# Patient Record
Sex: MALE | Race: WHITE | NOT HISPANIC OR LATINO | Employment: FULL TIME | URBAN - METROPOLITAN AREA
[De-identification: names, ages, dates, MRNs, and addresses within clinical notes are randomized per-mention and may not be internally consistent; named-entity substitution may affect disease eponyms.]

---
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
## 2019-02-26 ENCOUNTER — OFFICE VISIT (OUTPATIENT)
Dept: FAMILY MEDICINE | Facility: CLINIC | Age: 19
End: 2019-02-26

## 2019-02-26 VITALS
HEART RATE: 72 BPM | HEIGHT: 66 IN | TEMPERATURE: 98.9 F | WEIGHT: 140.6 LBS | BODY MASS INDEX: 22.6 KG/M2 | DIASTOLIC BLOOD PRESSURE: 68 MMHG | OXYGEN SATURATION: 98 % | SYSTOLIC BLOOD PRESSURE: 112 MMHG

## 2019-02-26 DIAGNOSIS — F90.9 ATTENTION DEFICIT HYPERACTIVITY DISORDER (ADHD), UNSPECIFIED ADHD TYPE: ICD-10-CM

## 2019-02-26 DIAGNOSIS — Z11.3 SCREEN FOR STD (SEXUALLY TRANSMITTED DISEASE): Primary | ICD-10-CM

## 2019-02-26 DIAGNOSIS — F12.10 CANNABIS ABUSE, EPISODIC: ICD-10-CM

## 2019-02-26 DIAGNOSIS — G47.00 PERSISTENT INSOMNIA: ICD-10-CM

## 2019-02-26 DIAGNOSIS — F15.11 AMPHETAMINE ABUSE IN REMISSION (H): ICD-10-CM

## 2019-02-26 DIAGNOSIS — F31.9 BIPOLAR I DISORDER (H): ICD-10-CM

## 2019-02-26 DIAGNOSIS — F39 MOOD DISORDER (H): ICD-10-CM

## 2019-02-26 PROCEDURE — 36415 COLL VENOUS BLD VENIPUNCTURE: CPT | Performed by: PHYSICIAN ASSISTANT

## 2019-02-26 PROCEDURE — 99213 OFFICE O/P EST LOW 20 MIN: CPT | Performed by: PHYSICIAN ASSISTANT

## 2019-02-26 ASSESSMENT — MIFFLIN-ST. JEOR: SCORE: 1587.57

## 2019-02-26 NOTE — NURSING NOTE
Aren is here for STD testing and sleep meds    Pre-visit Screening:  Immunizations:  up to date declines flu shot  Colonoscopy:  NA  Mammogram: NA  Asthma Action Test/Plan:  NA  PHQ9:  Sees psych  GAD7:  Sees psych  Questioned patient about current smoking habits Pt. vapes does not use tobacco  Ok to leave detailed message on voice mail for today's visit only Yes, phone # [phone]

## 2019-02-26 NOTE — PROGRESS NOTES
SUBJECTIVE:   Aren Emanuel is a 18 year old male who presents to clinic today for the following health issues:      Pt here requesting STD testing today  Partner(s): new  Recent known STD exposure: no  Past Hx of STD and treatment: no  Current symptoms of discharge/dysuria: no  Contraception use: usually condoms    Current partner has remote hx of treated chlamydia        Pt also wanting to get on Sleeping medication for trouble with sleep.  Has been on Trazodone in the past. Has been on psychiatrists in the past.     Has therapy in an hour.   Appt is with [facility]   [facility].     OTC: Melatonin and Benadryl  When taking Trazodone    Sometimes will go 72 hours w/o sleep    Pt has hx of Bipolar in Chart but denies this diagnosis.  States he has taken 300 mg of Trazodone w/o help.             Problem list and histories reviewed & adjusted, as indicated.  Additional history: as documented    Patient Active Problem List   Diagnosis     Attention deficit hyperactivity disorder (ADHD)     Bipolar I disorder (H)     Health Care Home     ACP (advance care planning)     Mood disorder (H)     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)     Cannabis abuse, episodic     Past Surgical History:   Procedure Laterality Date     XR WRIST SURGERY JAMES RIGHT  2017       Social History     Tobacco Use     Smoking status: Light Tobacco Smoker     Smokeless tobacco: Former User   Substance Use Topics     Alcohol use: Yes     Family History   Problem Relation Age of Onset     Depression Mother      Bipolar Disorder Mother      Hyperlipidemia Father      Seizure Disorder Sister      Depression Brother         estranged     Other - See Comments Brother         PTDS     Depression Sister      Anxiety Disorder Sister          Current Outpatient Medications   Medication Sig Dispense Refill     triamcinolone (KENALOG) 0.1 % cream Apply sparingly to affected area two times daily for 7 days. 15 g 0     VITAMIN D, CHOLECALCIFEROL, PO  Take 2,000 Units by mouth daily       No Known Allergies  Recent Labs   Lab Test 08/28/17  1238 06/28/16  1219 09/15/14  0750 09/14/14  1136   *  --  91  --    HDL 41*  --  49  --    TRIG 134*  --  78  --    ALT  --  35  --  27   CR  --  0.81  --  0.84   GFRESTIMATED  --  >90  Non  GFR Calc    --  GFR not calculated, patient <16 years old.  Non  GFR Calc     GFRESTBLACK  --  >90   GFR Calc    --  GFR not calculated, patient <16 years old.   GFR Calc     POTASSIUM  --  3.7  --  3.8   TSH  --   --  1.49  --       BP Readings from Last 3 Encounters:   08/31/18 96/62   07/19/18 98/64   08/28/17 102/68    Wt Readings from Last 3 Encounters:   08/31/18 64.9 kg (143 lb) (33 %)*   07/19/18 68.7 kg (151 lb 6.4 oz) (48 %)*   08/28/17 89.1 kg (196 lb 6.4 oz) (93 %)*     * Growth percentiles are based on CDC (Boys, 2-20 Years) data.                  Labs reviewed in EPIC    Reviewed and updated as needed this visit by clinical staff       Reviewed and updated as needed this visit by Provider         ROS:  Constitutional, HEENT, cardiovascular, pulmonary, gi and gu systems are negative, except as otherwise noted.    OBJECTIVE:     There were no vitals taken for this visit.  There is no height or weight on file to calculate BMI.      (male): penis normal without urethral discharge  Swab obtained    Mental Status Exam:   Appearance: agitated  Grooming: adequately groomed  Demeanor: engaged  Affect: agitated and alert  Speech: Rapid  Gait:Normal.  Movements: Normal  Form of thought: Linear  Thought content:  Normal  Insight:Fair   Judgment: Fair   Cognition: Good           ASSESSMENT/PLAN:       1. Screen for STD (sexually transmitted disease)    2. Persistent insomnia    3. Bipolar I disorder (H)    4. 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)    5. Mood disorder (H)    6. Cannabis abuse, episodic    7. Attention deficit hyperactivity disorder (ADHD), unspecified  ADHD type        Screening for STD today    Based on patients hx of drug abuse and mental health, I have again reiterated to the patient that I will not prescribe any mental health medications for him. I offered Vistaril for sleep which he declines. I have offered referral to sleep specialist, however I feel his underlying mental health is the contributor to his insomnia.    I have referred him to [facility] for psychiatrist help.       FACUNDO Bell  [facility]

## 2019-02-27 LAB
HCV AB - QUEST: NORMAL
HIV 1/2 AGN ABY 4TH GEN WITH REFLEX: NORMAL
RPR SCREEN - QUEST: NORMAL
SIGNAL TO CUT OFF - QUEST: 0.01

## 2019-02-28 ENCOUNTER — TELEPHONE (OUTPATIENT)
Dept: FAMILY MEDICINE | Facility: CLINIC | Age: 19
End: 2019-02-28

## 2019-02-28 LAB
CHLAMYDIA TRACHOMATIS RNA, TMA - QUEST: NOT DETECTED
NEISSERIA GONORRHOEAE RNA TMA: NOT DETECTED
SEE NOTE - QUEST: NORMAL

## 2019-02-28 NOTE — TELEPHONE ENCOUNTER
Please call pt  OKTLM if he doesn't answer    HIV, syphilis, chlamydia and gonorrhea testing was all negative.    Close encounter after leaving VM please.    Zuleyma Faith PA-C  2/28/2019

## 2020-03-09 ENCOUNTER — TRANSCRIBE ORDERS (OUTPATIENT)
Dept: OTHER | Age: 20
End: 2020-03-09

## 2020-03-09 DIAGNOSIS — R10.2 PELVIC PAIN IN FEMALE: Primary | ICD-10-CM

## 2020-03-09 DIAGNOSIS — R10.2 PELVIC PAIN: ICD-10-CM

## 2020-03-09 DIAGNOSIS — R31.0 GROSS HEMATURIA: ICD-10-CM

## 2020-03-16 NOTE — TELEPHONE ENCOUNTER
MEDICAL RECORDS REQUEST   [facility]  Urology Clinic  [address] [place] [address]  PHONE: [phone]  Fax: [phone]        FUTURE VISIT INFORMATION                                                   Aren Emanuel, : 2000 scheduled for future visit at [facility]    APPOINTMENT INFORMATION:    Date: 20 2:30PM    Provider:  Jack Hirsch MD    Reason for Visit/Diagnosis: Gross hematuria     REFERRAL INFORMATION:    Referring provider: Tom Boggs    Specialty: PA-C    Referring providers clinic:  [facility] contact number:  [phone]    RECORDS REQUESTED FOR VISIT                                                     NOTES  STATUS/DETAILS   OFFICE NOTE from referring provider  yes   OFFICE NOTE from other specialist  yes   DISCHARGE SUMMARY from hospital  no   DISCHARGE REPORT from the ER  no   OPERATIVE REPORT  no   MEDICATION LIST  no   LABS     URINALYSIS (UA)  yes     PRE-VISIT CHECKLIST      Record collection complete Yes- Internal recs in CE  Fax to [facility] to obtain im 3/16 12:21PM   Appointment appropriately scheduled           (right time/right provider) Yes   MyChart activation If no, please explain: In process    Questionnaire complete If no, please explain: In process      Completed by: Krystyna Thurman

## 2020-04-09 ENCOUNTER — TELEPHONE (OUTPATIENT)
Dept: UROLOGY | Facility: CLINIC | Age: 20
End: 2020-04-09

## 2020-04-09 NOTE — TELEPHONE ENCOUNTER
"Left voicemail informing the patient that the clinic is converting appointments to Video Visits due to COVID-19. The patient was instructed to call the clinic back at [phone].      When the patient returns the call please convert their appointment to a video visit. The patient must download the application \"[employer] Touchpoint\". The patient will receive another call prior to their appointment with further instructions.      Renea Goodwin, EMT  "

## 2020-04-13 ENCOUNTER — PRE VISIT (OUTPATIENT)
Dept: UROLOGY | Facility: CLINIC | Age: 20
End: 2020-04-13

## 2020-04-13 NOTE — TELEPHONE ENCOUNTER
Chief Complaint : Gross hematuria     Records/Orders: CT in care everywhere      Pt Contacted: yes    At Rooming: alton after meeting

## 2020-04-20 ENCOUNTER — PRE VISIT (OUTPATIENT)
Dept: UROLOGY | Facility: CLINIC | Age: 20
End: 2020-04-20

## 2020-04-20 ENCOUNTER — OFFICE VISIT (OUTPATIENT)
Dept: UROLOGY | Facility: CLINIC | Age: 20
End: 2020-04-20
Attending: PHYSICIAN ASSISTANT
Payer: COMMERCIAL

## 2020-04-20 VITALS — HEIGHT: 66 IN | BODY MASS INDEX: 22.69 KG/M2

## 2020-04-20 DIAGNOSIS — R31.0 GROSS HEMATURIA: ICD-10-CM

## 2020-04-20 DIAGNOSIS — N39.8 DYSFUNCTIONAL VOIDING OF URINE: Primary | ICD-10-CM

## 2020-04-20 RX ORDER — LIDOCAINE HYDROCHLORIDE 20 MG/ML
JELLY TOPICAL ONCE
Status: COMPLETED | OUTPATIENT
Start: 2020-04-20 | End: 2020-04-20

## 2020-04-20 RX ADMIN — LIDOCAINE HYDROCHLORIDE: 20 JELLY TOPICAL at 12:15

## 2020-04-20 ASSESSMENT — PAIN SCALES - GENERAL: PAINLEVEL: MODERATE PAIN (4)

## 2020-04-20 NOTE — PATIENT INSTRUCTIONS
Please follow up with Dr. Hirsch as needed. You will be receiving a call from physical therapy in the near future regarding your referral. Continue treatment with them if desired.       It was a pleasure meeting with you today.  Thank you for allowing me and my team the privilege of caring for you today.  YOU are the reason we are here, and I truly hope we provided you with the excellent service you deserve.  Please let us know if there is anything else we can do for you so that we can be sure you are leaving completely satisfied with your care experience.        Joshua Taveras, EMT

## 2020-04-20 NOTE — NURSING NOTE
"Chief Complaint   Patient presents with     Consult     Gross hematuria        Height 1.676 m (5' 6\"). Body mass index is 22.69 kg/m .    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 15 g 0     VITAMIN D, CHOLECALCIFEROL, PO Take 2,000 Units by mouth daily         Social History     Tobacco Use     Smoking status: Light Tobacco Smoker     Smokeless tobacco: Former User   Substance Use Topics     Alcohol use: Yes     Drug use: Yes     Types: Marijuana       Invasive Procedure Safety Checklist:    Procedure: Cystoscopy    Action: Complete sections and checkboxes as appropriate.    Pre-procedure:  1. Patient ID Verified with 2 identifiers (Courtney and  or MRN) : YES    2. Procedure and site verified with patient/designee (when able) : YES    3. Accurate consent documentation in medical record : YES    4. H&P (or appropriate assessment) documented in medical record : N/A  H&P must be up to 30 days prior to procedure an updated within 24 hours of                 Procedure as applicable.     5. Relevant diagnostic and radiology test results appropriately labeled and displayed as applicable : YES    6. Blood products, implants, devices, and/or special equipment available for the procedure as applicable : YES    7. Procedure site(s) marked with provider initials [Exclusions: none] : NO    8. Marking not required. Reason : Yes  Procedure does not require site marking    Time Out:     Time-Out performed immediately prior to starting procedure, including verbal and active participation of all team members addressing: " YES    1. Correct patient identity.  2. Confirmed that the correct side and site are marked.  3. An accurate procedure to be done.  4. Agreement on the procedure to be done.  5. Correct patient position.  6. Relevant images and results are properly labeled and appropriately displayed.  7. The need to administer antibiotics or fluids for irrigation purposes during the procedure as applicable.  8. Safety precautions based on patient history or medication use.    During Procedure: Verification of correct person, site, and procedure occurs any time the responsibility for care of the patient is transferred to another member of the care team.    The following medication was given:     MEDICATION: Lidocaine Uro-Jet 2% 200mg (20mg/mL)  ROUTE: Urethral   SITE: Urethra   DOSE: 10mL  LOT #: [id number]  : [employer]   EXPIRATION DATE: 12-21  NDC#: [address]-38   Was there drug waste? No    Prior to injection, verified patient identity using patient's name and date of birth.  Due to injection administration, patient instructed to remain in clinic for 15 minutes  afterwards, and to report any adverse reaction to me immediately.    Drug Amount Wasted:  None.  Vial/Syringe: Single dose vial      Joshua Taveras, EMT  4/20/2020  12:04 PM

## 2020-04-20 NOTE — PROGRESS NOTES
Service Date: 04/20/2020      REASON FOR VISIT TODAY:  Gross hematuria and pelvic pain.      HISTORY OF PRESENT ILLNESS:  Mr. Emanuel is a 19-year-old gentleman who recently moved to the [facility] from [place] who comes to see us today for a history of pelvic pain and question of gross hematuria.  The patient started having some pelvic pain last fall and was evaluated by numerous physicians including Urology in [place] and felt the patient likely to have prostatitis.  He was treated with several courses of antibiotics and was checked for STIs, which were negative.  The patient felt that the antibiotics may have helped some, though it was unclear if they have helped significantly.  The patient does note that he has significant anxiety and that his symptoms do get worse when his anxiety has worsened.  The patient does give a longstanding history of difficulty voiding in public places and other symptoms consistent with dysfunctional voiding.  The patient notes that currently he feels that his pelvic pain symptoms are better, though he still is somewhat conscious of the sensation of pressure in his perineum.  He has been taking Flomax to help make it easier to urinate.      The patient also had 1 episode of voiding over this winter where he questioned whether there was a tiny blood clot that was passed.  Due to this, the patient began a hematuria evaluation in [place] including a CT which demonstrated no reason for the hematuria and the patient also has a negative cytology.  The patient moved to the [facility] prior to completing a cystoscopy.      PAST MEDICAL HISTORY:  Bipolar disease, polysubstance abuse, ADHD.      PAST SURGICAL HISTORY:  Includes right wrist surgery.      MEDICATIONS:  Vitamin D, Kenalog cream.      ALLERGIES:  No known drug allergies.      FAMILY HISTORY:  Negative for bladder cancer.      SOCIAL HISTORY:  Again, significant for polysubstance abuse.      REVIEW OF SYSTEMS:  Negative for  fevers, chills, sweats, nausea, vomiting, unexplained weight changes.      PHYSICAL EXAMINATION:  On exam today, the patient's height is 5 feet 6 inches.  He declined weight and blood pressures.        The patient's CT scan from 03/02/2020 shows no reason for hematuria.  No evidence of kidney stones.  The patient's urine cytology from 02/2020 was negative for high-grade malignancy.      ASSESSMENT AND PLAN:  Over half of today's 45-minute visit was spent counseling the patient regarding his pelvic pain and his hematuria.  I suggested to Mr. Emanuel we are pleased to hear his symptoms are getting a little bit better.  It is unclear if further antibiotics would be of any further benefit.  We did discuss the possibility of pelvic floor relaxation exercises through physical therapy given his history of likely dysfunctional voiding and that this may be playing a role in his urgency, frequency and possible development of prostatitis. The patient is interested in this and we will get him set up for that.      In addition, I counseled the patient that the second issue to consider is his gross hematuria.  At this point, it is unclear whether or not the patient really did have an episode of a clot being passed.  However, the patient has nearly completed the hematuria eval, and to be complete we should go ahead and perform the cystoscopy.  The patient is in agreement with the plan.      Therefore, as a separate procedure, we were able to find time to bring the patient back for a cystoscopy.      PROCEDURE:  After informed consent was obtained, the patient was prepped and draped in standard sterile fashion.  A flexible cystoscope was introduced into the patient's bladder without difficulty.  Inspection of the bladder revealed orthotopic UOs.  There were no foreign objects, stones or mucosal lesions noted in the bladder.  There were no strictures in the urethra.  The patient tolerated the procedure without difficulty.        I  counseled the patient he has now completed his hematuria eval and there are no sinister causes for what may or may not have been the gross hematuria.  Fortunately, the patient does not have any evidence of persistent microhematuria and so no further workup is required at this time.  The patient will otherwise follow up with us as needed going forward.         THALIA GALDAMEZ MD             D: 2020   T: 2020   MT: ever      Name:     AL WINTER MCDOWELL   MRN:      [id number]        Account:      [id number]   :      2000           Service Date: 2020      Document: [id number]

## 2020-05-06 ENCOUNTER — VIRTUAL VISIT (OUTPATIENT)
Dept: PHYSICAL THERAPY | Facility: CLINIC | Age: 20
End: 2020-05-06
Attending: UROLOGY
Payer: COMMERCIAL

## 2020-05-06 DIAGNOSIS — R10.2 PELVIC PAIN IN MALE: ICD-10-CM

## 2020-05-06 DIAGNOSIS — N39.8 DYSFUNCTIONAL VOIDING OF URINE: ICD-10-CM

## 2020-05-06 DIAGNOSIS — M99.05 SOMATIC DYSFUNCTION OF PELVIS REGION: Primary | ICD-10-CM

## 2020-05-06 PROCEDURE — 97112 NEUROMUSCULAR REEDUCATION: CPT | Mod: 95 | Performed by: PHYSICAL THERAPIST

## 2020-05-06 PROCEDURE — 97535 SELF CARE MNGMENT TRAINING: CPT | Mod: 95 | Performed by: PHYSICAL THERAPIST

## 2020-05-06 NOTE — PROGRESS NOTES
"Physical Therapy Virtual Initial Visit      The patient has been notified of following:     \"This virtual visit will be conducted between you and your provider. We have found that certain health care needs can be provided without the need for physical presence.  This service lets us provide the care you need with a virtual visit.\"    Due to external, as well as internal [facility] management of the COVID-19 Virus, Aren Emanuel was not seen in our clinic.  As a substitution, we implemented a virtual visit to manage this patient's condition utilizing the PTRx virtual visit platform via the patient s existing code.  The provider, Christina Le, reviewed the patient's chart, PTRx prescription, and spoke with the patient to determine the following telemedicine visit is appropriate and effective for the patient's care.    The following type of visit was completed:   Telephone Visit:  A telephone visit was used in conjunction with the online PTRx exercise platform.         Subjective:  The history is provided by the patient. No  was used.   Therapist Generated HPI Evaluation  Problem details: Patient reports a history of pelvic pain and voiding dysfunction since 9/2019.    Most recently saw MD for this on 4/20/2020.  Initially patient reported he was going to school and had increased stress.  Also reports he has had hesitation with voiding in public places.  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  Reports he was told he should be taking in 3 liters of fluid a day and finds this difficult to do with this job.  Reports he continues to have hesitation with voiding and pain in the perineum that is worse with sitting.  Patient has run for exercise in the past but is not now due to pain..         Type of problem:  Pelvic dysfunction.    This is a new condition.  Condition occurred with:  Insidious onset.  Where condition occurred: for unknown reasons.  Site of Pain: " perineum.  Pain is described as burning and is intermittent.  Pain is the same all the time.  Since onset symptoms are unchanged.  Symptoms are exacerbated by running and sitting  and relieved by nothing.  Special tests included:  CT scan (normal).    Restrictions due to condition include:  Working in normal job without restrictions.      Patient Health History  Aren Emanuel being seen for pelvic dysfunction.            General health as reported by patient is good.  Health conditions: anxiety.   Red flags:  None as reported by patient.                Primary job tasks include:  Prolonged standing.                              Objective:    System                               Pelvic Dysfunction Evaluation:    Bladder/Pelvic Problems:    Storage Problem:  Frequency and urgency  Emptying Problem:  Hesitancy      Diagnostic Tests:    Pelvic Exam:  X  UA/Urine Sample:  Normal                                                     General   ROS    PTRx Content from today's visit:  Exercise Name: Pretzel Stretch, Sets: 1 - Reps: 2-3, Notes: hold 30 sec  Exercise Name: Piriformis Stretch Above 90 Degress Supine, Sets: 1 - Reps: 2-3, Notes: hold 30 sec  Exercise Name: All 4s Stretch, Sets: 1 - Reps: 3, Notes: hold 30 sec  Exercise Name:  Gluteal Stretch, Sets: 1 - Reps: 2-3, Notes: hold 30 sec  Exercise Name: Supine Butterfly, Sets: 1 - Reps: 3, Notes: hold 30 sec  Exercise Name: Toileting Position  Exercise Name: Pelvic Floor Rest Position Seated  Exercise Name: Pelvic Floor Anatomy and Function for Males  Exercise Name: General Bladder Information  Exercise Name: Bladder Diary  Exercise Name: Overactive Dysfunction and Suggestions to Reduce Overactive Dysfunction  Exercise Name: Relaxed Awareness of Pelvic Floor Handout  Exercise Name: Pelvic Floor Quieting Techniques    Assessment/Plan:     Patient is a 19 year old male with pelvic dysfunction complaints.    Patient has the following significant findings with  corresponding treatment plan.                Diagnosis 1:  Pelvic pain  Pain -  manual therapy, self management, education and home program  Impaired muscle performance - neuro re-education and home program  Decreased function - therapeutic activities and home program    Therapy Evaluation Codes:   1) History comprised of:   Personal factors that impact the plan of care:      Anxiety and Time since onset of symptoms.    Comorbidity factors that impact the plan of care are:      Mental illness.     Medications impacting care: None.  2) Examination of Body Systems comprised of:   Body structures and functions that impact the plan of care:      Pelvis.   Activity limitations that impact the plan of care are:      Urgency and pelvic pain.  3) Clinical presentation characteristics are:   Evolving/Changing.  4) Decision-Making    Moderate complexity using standardized patient assessment instrument and/or measureable assessment of functional outcome.  Cumulative Therapy Evaluation is: Moderate complexity.    Previous and current functional limitations:  (See Goal Flow Sheet for this information)    Short term and Long term goals: (See Goal Flow Sheet for this information)     Communication ability:  Patient appears to be able to clearly communicate and understand verbal and written communication and follow directions correctly.  Treatment Explanation - The following has been discussed with the patient:   RX ordered/plan of care  Anticipated outcomes  Possible risks and side effects  This patient would benefit from PT intervention to resume normal activities.   Rehab potential is good.    Frequency:  2 X a month, once daily  Duration:  for 2 months  Discharge Plan:  Achieve all LTG.  Independent in home treatment program.  Reach maximal therapeutic benefit.    Please refer to the daily flowsheet for treatment today, total treatment time and time spent performing 1:1 timed codes.       Virtual visit contact time    Time of  service began: 9:45 AM  Time of service ended: 10:25 AM  Total Time for set up, visit, and documentation: 50 minutes    Payor: [facility] / Plan: [employer] / Product Type: HMO /     Procedure Code/s   Neuromuscular Re-education (09685): 15 minutes  Self Care / Home Management Training (79726): 10 minutes  Evaluation: 15 minutes    I have reviewed the note as documented above.  This accurately captures the substance of my conversation with the patient.  [place] ([facility]/State)  Patient location: home    ___________________________________________________

## 2020-05-20 ENCOUNTER — VIRTUAL VISIT (OUTPATIENT)
Dept: PHYSICAL THERAPY | Facility: CLINIC | Age: 20
End: 2020-05-20
Payer: COMMERCIAL

## 2020-05-20 DIAGNOSIS — R10.2 PELVIC PAIN IN MALE: ICD-10-CM

## 2020-05-20 PROCEDURE — 97535 SELF CARE MNGMENT TRAINING: CPT | Mod: 95 | Performed by: PHYSICAL THERAPIST

## 2020-05-20 NOTE — PROGRESS NOTES
"Physical Therapy Virtual Follow Up Visit      The patient has been notified of following:     \"This virtual visit will be conducted between you and your provider. We have found that certain health care needs can be provided without the need for physical presence.  This service lets us provide the care you need with a virtual visit.\"    Due to external, as well as internal [facility] management of the COVID-19 Virus, Aren Emanuel was not seen in our clinic.  As a substitution, we implemented a virtual visit to manage this patient's condition utilizing the PTRx virtual visit platform via the patient s existing code.  The provider, Christina Le, reviewed the patient's chart, PTRx prescription, and spoke with the patient to determine the following telemedicine visit is appropriate and effective for the patient's care.    The following type of visit was completed:   Telephone Visit:  A telephone visit was used in conjunction with the online PTRx exercise platform.        S: Aren Emanuel is a 19 year old male. Connected virtually on the PTRx platform to discuss their condition/progress. They noted improvements in no changes noted yet.  They noted ongoing pain or limitations with has tried stretching exercises but did not notice a change.  Has not gotten a heating pad yet.  Notes fullness in the pelvic region with running.     Current pain level: No change    O: Patient demonstrated We discussed stopping the stretching for two weeks and focus on pelvic strengthening as patient reports he feels some symptoms with running.  Patient has exercise bands at home.  He has done strengthening in the past but not recently.  Does some pull ups and push ups daily.  Discussed how pelvic muscle strengthening can help with better neuromuscular control of the pelvic region.       PTRx Content from today's visit:    Exercise Name: Hip Abduction With Theraband, Sets: 1 - Reps: 20 - Sessions: 1, Notes: don't hold on  Exercise " Name: Hip Adduction With Theraband, Sets: 1 - Reps: 20 - Sessions: 1, Notes: don't hold on  Exercise Name: Shoulder Theraband Rows, Sets: 1 - Reps: 20 - Sessions: 1  Exercise Name: Squat, Sets: 1 - Reps: 10 - Sessions: 1, Notes: 3 times a week  can tie tubing around knees  Exercise Name: Pushups  Exercise Name: Sumo Squats, Notes: do this after run for a deep stretch  Exercise Name: Education Sheet General, Notes: Recommend 2 liters of fluid as this is in the normal range.  However, if you are sweating a lot, you should take in more fluid.  Exercise Name: Pretzel Stretch, Sets: 1 - Reps: 2-3, Notes: hold 30 sec  Exercise Name: Piriformis Stretch Above 90 Degress Supine, Sets: 1 - Reps: 2-3, Notes: hold 30 sec  Exercise Name: All 4s Stretch, Sets: 1 - Reps: 3, Notes: hold 30 sec  Exercise Name:  Gluteal Stretch, Sets: 1 - Reps: 2-3, Notes: hold 30 sec  Exercise Name: Supine Butterfly, Sets: 1 - Reps: 3, Notes: hold 30 sec  Exercise Name: Toileting Position  Exercise Name: Pelvic Floor Rest Position Seated  Exercise Name: Pelvic Floor Anatomy and Function for Males  Exercise Name: General Bladder Information  Exercise Name: Bladder Diary  Exercise Name: Overactive Dysfunction and Suggestions to Reduce Overactive Dysfunction  Exercise Name: Relaxed Awareness of Pelvic Floor Handout  Exercise Name: Pelvic Floor Quieting Techniques  A:   No change of symptoms has been noted.  Response to therapy has shown lack of progress in  pain level      P: Patient will continue with the exercise program assigned on their PTRx code and will add the following measures to manage their pain/condition: try strengthening for better pelvic muscle awaremess     Current treatment program is being advanced to more complex exercises.      Virtual visit contact time    Time of service began: 11:20 AM  Time of service ended: 11:42 AM  Total Time for set up, visit, and documentation: 30 minutes    Payor: [employer] / Plan: [employer] / Product Type: HMO /   .  Procedure Code/s   Self Care / Home Management Training (28811): 22 minutes    I have reviewed the note as documented above.  This accurately captures the substance of my conversation with the patient.  [place] location[place] ([facility]/State)  Patient location: home

## 2020-06-27 ENCOUNTER — HOSPITAL ENCOUNTER (EMERGENCY)
Facility: CLINIC | Age: 20
Discharge: HOME | End: 2020-06-27
Attending: EMERGENCY MEDICINE | Admitting: EMERGENCY MEDICINE
Payer: COMMERCIAL

## 2020-06-27 VITALS
SYSTOLIC BLOOD PRESSURE: 104 MMHG | TEMPERATURE: 98.5 F | WEIGHT: 151 LBS | BODY MASS INDEX: 24.27 KG/M2 | HEART RATE: 50 BPM | DIASTOLIC BLOOD PRESSURE: 74 MMHG | HEIGHT: 66 IN | RESPIRATION RATE: 16 BRPM

## 2020-06-27 DIAGNOSIS — K40.90 UNILATERAL INGUINAL HERNIA WITHOUT OBSTRUCTION OR GANGRENE, RECURRENCE NOT SPECIFIED: ICD-10-CM

## 2020-06-27 PROCEDURE — 99282 EMERGENCY DEPT VISIT SF MDM: CPT

## 2020-06-27 ASSESSMENT — MIFFLIN-ST. JEOR: SCORE: 1637.68

## 2020-06-27 NOTE — ED PROVIDER NOTES
History     Chief Complaint:  Groin Pain    HPI   Aren Emanuel is a 19 year old male, with a history of bipolar 1 disorder and chronic prostatitis, who presents with concern of right-sided groin pain.  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  In the process of working he developed right groin and right lower back pain.  He is concerned he may have developed a hernia.  He denies feeling a lump or mass in the groin or scrotum.  He has not felt a testicular mass.  He denies changes in his urination.  No changes in his bowel habits.  Pain is exacerbated by range of motion of his torso and specifically bending forward and straining with lifting or with bowel movements.  Of note the patient states he has suffered prostatitis off and on for the past year or so and has undergone cystoscopy with urologic consultation.  Cystoscopy was negative.  Currently he is not taking an antibiotic.  Denies nausea or vomiting.  No fever.  No history of abdominal surgery.  No other complaints.    Allergies:  No known drug allergies     Medications:    Wellbutrin  Seroquel  Flomax  Amitriptyline    Past Medical History:    Mood disorder  Cannabis abuse  Amphetamine abuse in remission  ADHD  Bipolar 1 disorder  Chronic prostatitis  Vitamin D deficiency  Anxiety and depression    Past Surgical History:    Right wrist surgery  Tonsillectomy    Family History:    ADD  Mental health  Alcohol/drug  Hyperlipidemia  Depression  Bipolar disorder  PTSD    Social History:  Smoking status: Light tobacco smoker  Smokeless tobacco: Former user  Alcohol use: Yes  Drug use: Yes, marijuana  The patient presents to the emergency department by himself.  Marital Status:  Single [1]     Review of Systems  All systems otherwise negative or per HPI    Physical Exam   Patient Vitals for the past 24 hrs:   BP Temp Temp src Pulse Resp Height Weight   06/27/20 1025 104/74 98.5  F (36.9  C) Oral 50 16 1.676 m (5'  "6\") 68.5 kg (151 lb)     Physical Exam  SKIN:  Warm, dry.  No right groin rash or erythema or ecchymoses.  HEMATOLOGIC/IMMUNOLOGIC/LYMPHATIC:  No right inguinal adenopathy.  EYES:  Conjunctivae normal.  CARDIOVASCULAR:  Regular rate and rhythm.  No murmur.  RESPIRATORY:  No respiratory distress, breath sounds equal and normal.  GASTROINTESTINAL:  Soft, nontender abdomen with active bowel sounds.  No palpable abdominal mass.  No distention.  GENITOURINARY: Noninflamed genitals.  No genital rash.  No testicular mass or swelling.  No palpable right inguinal canal mass with Valsalva.  No palpable right groin mass otherwise.  MUSCULOSKELETAL: Range of motion of torso exacerbates right groin pain.  NEUROLOGIC:  Alert, conversant.  PSYCHIATRIC:  Normal mood.    Emergency Department Course   Emergency Department Course:  Past medical records, nursing notes, and vitals reviewed.  1013: I performed an exam of the patient and obtained history, as documented above.     Findings and plan explained to the Patient. Patient discharged home with instructions regarding supportive care, medications, and reasons to return. The importance of close follow-up was reviewed.   Impression & Plan   Medical Decision Making:  This patient has a history consistent with right inguinal hernia.  Given there is no palpable mass and currently he is feeling fairly well I doubt incarceration.  I provided a surgical consult referral for this problem.  He declined urinalysis to assess for infection or hematuria.  He does not note any new urinary symptoms.    Diagnosis:    ICD-10-CM   1. Unilateral inguinal hernia without obstruction or gangrene, recurrence not specified  K40.90     Disposition:  Discharged to home.    Kati Mckeon  6/27/2020    EMERGENCY DEPARTMENT  Scribe Disclosure:  Kati PAPPAS, am serving as a scribe at 10:13 AM on 6/27/2020 to document services personally performed by Tim Vargas MD based on my observations and the " provider's statements to me.      Tim Vargas MD  06/27/20 3858

## 2020-06-27 NOTE — ED AVS SNAPSHOT
Emergency Department  [address] [facility] [address]  Phone:  [phone]  Fax:  [phone]                                    Aren Emanuel   MRN: [id number]    Department:   Emergency Department   Date of Visit:  6/27/2020           After Visit Summary Signature Page    I have received my discharge instructions, and my questions have been answered. I have discussed any challenges I see with this plan with the nurse or doctor.    ..........................................................................................................................................  Patient/Patient Representative Signature      ..........................................................................................................................................  Patient Representative Print Name and Relationship to Patient    ..................................................               ................................................  Date                                   Time    ..........................................................................................................................................  Reviewed by Signature/Title    ...................................................              ..............................................  Date                                               Time          22EPIC Rev 08/18

## 2020-07-08 NOTE — PROGRESS NOTES
[facility] Surgery Consultation    PCP:  No Ref-Primary, Physician None    HPI: Patient is a 20 year old male who is seen in consultation, referred by  who presents due to concern for possible inguinal hernia. He was seen in the ER on 6/27 due to right groin pain. He has a history of prostatitis and has seen urology and had negative cystoscopy in the past year.     He was seen by PT on 5/6/2020 due to pelvic pain and voiding dysfunction. He had an abdominal CT on 3/2/2020 which revealed a small periumbilical hernia but no other areas of concern per report.     He reports that he was doing a lot of lifting for a new job in May-June and one day with lifting a heavy item had immediate pain in the right groin. Since that time he has had discomfort with lifting and certain movements. He also feels a fullness in the right lower abdomen. He has some mild intermittent discomfort on the left groin as well.     In regard to his history of prostatis - he reports he was seen by a PCP in [place] earlier this year and was diagnosed with this based on physical exam. He was treated with a course of abx. His symptoms of incomplete bladder emptying and urinary retention persisted. He saw Dr. Hirsch with Urology and underwent cystoscopy which was negative. He is very frustrated by this problem and is concerned no etiology was identified. He would like to see urology again.     He currently smokes both cigarettes and marijuana. He denies history of bipolar disorder.     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  PSH:    has a past surgical history that includes XR Wrist Surgery JAMES Right (2017).tonsillectomy  Social History:   reports that he has been smoking. He has quit using smokeless tobacco. He reports current  "alcohol use. He reports current drug use. Drug: Marijuana.  Family History:  family history includes Anxiety Disorder in his sister; Bipolar Disorder in his mother; Depression in his brother, mother, and sister; Hyperlipidemia in his father; Other - See Comments in his brother; Seizure Disorder in his sister.     Medications/Allergies: Home medications and allergies reviewed.    ROS:  The 10 point Review of Systems is negative other than noted in the HPI.    Physical Exam:  /80   Pulse 70   Ht 1.676 m (5' 6\")   Wt 68 kg (150 lb)   BMI 24.21 kg/m    GENERAL: Generally appears well.  Psych: Alert and Oriented.  Normal affect  Eyes: Sclera clear  Respiratory:  Lungs clear to ausculation bilaterally with good air excursion  Cardiovascular:  Regular Rate and Rhythm with no murmurs gallops or rubs, normal peripheral pulses  GI: Abdomen soft, nontender, nondistended, small umbilical hernia, reducible, tender with reduction,  normal bowel sounds, no hepatosplenomegaly.   Groin- Right Groin- Small inguinal hernia, reducible, pt very tender with exam and difficult to determine size of fascial defect. Left Groin- Small inguinal hernia. No scrotal or testicle abnormalities.  Lymphatic/Hematologic/Immune:  No femoral or cervical lymphadenopathy.  Integumentary:  No rashes  Neurological: grossly intact     All new lab and imaging data was reviewed.     Impression and Plan:  Patient is a 20 year old male with bilateral inguinal hernias, right larger than left and small umbilical hernia with h/o urinary retention/incomplete emptying/prostatis. He is symptomatic from his inguinal hernias and I recommend repair. A TEPP laparoscopic repair would be an excellent option for him. We discussed that his risk of urinary retention post operatively would be higher given his preoperative symptoms. I would like him to see Urology prior to surgery to discuss these symptoms further and for recommendation. It is important that there is " no concern for prostatitis at the time of surgery due to increased risk of infection. We also discussed increased risk of infection due to his smoking. He actually stood up and threw away his cigarettes in the room while we discussed this and says he has just quit.     PLAN: laparoscopic repair - TEPP   I discussed the pathophysiology of hernias and options for repair including laparoscopic VS open.  The risks associated with the procedure including, but not limited to, recurrence, nerve entrapment or injury, persistence of pain, injury to the bowel/bladder, infertility, hematoma, mesh migration, mesh infection, MI, and PE were discussed with the patient. He indicated understanding of the discussion, asked appropriate questions, and provided consent. Signs and symptoms of incarceration were discussed. If these develop in the interim, he promises to call or go straight to the ER. I have provided the patient with an information pamphlet.  It was my pleasure to participate in the care of Aren DAMIEN Emanuel in clinic today. Thank you for this consultation.       Alisha Montero MD  [facility]  [phone]    Please route or send letter to:  Primary Care Provider (PCP) and Referring Provider

## 2020-07-10 ENCOUNTER — OFFICE VISIT (OUTPATIENT)
Dept: SURGERY | Facility: CLINIC | Age: 20
End: 2020-07-10
Payer: COMMERCIAL

## 2020-07-10 ENCOUNTER — PREP FOR PROCEDURE (OUTPATIENT)
Dept: SURGERY | Facility: CLINIC | Age: 20
End: 2020-07-10

## 2020-07-10 VITALS
HEIGHT: 66 IN | WEIGHT: 150 LBS | SYSTOLIC BLOOD PRESSURE: 120 MMHG | DIASTOLIC BLOOD PRESSURE: 80 MMHG | HEART RATE: 70 BPM | BODY MASS INDEX: 24.11 KG/M2

## 2020-07-10 DIAGNOSIS — K40.20 BILATERAL INGUINAL HERNIA: Primary | ICD-10-CM

## 2020-07-10 DIAGNOSIS — K42.9 UMBILICAL HERNIA: ICD-10-CM

## 2020-07-10 DIAGNOSIS — K40.20 NON-RECURRENT BILATERAL INGUINAL HERNIA WITHOUT OBSTRUCTION OR GANGRENE: Primary | ICD-10-CM

## 2020-07-10 DIAGNOSIS — K40.20 BILATERAL INGUINAL HERNIA: ICD-10-CM

## 2020-07-10 DIAGNOSIS — R10.2 PELVIC PAIN IN MALE: Primary | ICD-10-CM

## 2020-07-10 PROCEDURE — 99204 OFFICE O/P NEW MOD 45 MIN: CPT | Performed by: SURGERY

## 2020-07-10 ASSESSMENT — MIFFLIN-ST. JEOR: SCORE: 1628.15

## 2020-07-15 ENCOUNTER — TELEPHONE (OUTPATIENT)
Dept: UROLOGY | Facility: CLINIC | Age: 20
End: 2020-07-15

## 2020-07-15 ENCOUNTER — VIRTUAL VISIT (OUTPATIENT)
Dept: UROLOGY | Facility: CLINIC | Age: 20
End: 2020-07-15
Payer: COMMERCIAL

## 2020-07-15 VITALS — HEIGHT: 66 IN | WEIGHT: 150 LBS | BODY MASS INDEX: 24.11 KG/M2

## 2020-07-15 DIAGNOSIS — R10.2 PELVIC PAIN IN MALE: Primary | ICD-10-CM

## 2020-07-15 PROCEDURE — 99214 OFFICE O/P EST MOD 30 MIN: CPT | Mod: 95 | Performed by: UROLOGY

## 2020-07-15 ASSESSMENT — MIFFLIN-ST. JEOR: SCORE: 1628.15

## 2020-07-15 ASSESSMENT — PAIN SCALES - GENERAL: PAINLEVEL: MODERATE PAIN (4)

## 2020-07-15 NOTE — NURSING NOTE
Chief Complaint   Patient presents with     Abdominal Pain   Patient has lower abdominal hernia pain.On the pain scale he states his pain is a 3/4 out of 10.  Nolvia Vallejo LPN

## 2020-07-15 NOTE — PROGRESS NOTES
"Aren Emanuel is a 20 year old male who is being evaluated via a billable video visit.      The patient has been notified of following:     \"This video visit will be conducted via a call between you and your physician/provider. We have found that certain health care needs can be provided without the need for an in-person physical exam.  This service lets us provide the care you need with a video conversation.  If a prescription is necessary we can send it directly to your pharmacy.  If lab work is needed we can place an order for that and you can then stop by our lab to have the test done at a later time.    Video visits are billed at different rates depending on your insurance coverage.  Please reach out to your insurance provider with any questions.    If during the course of the call the physician/provider feels a video visit is not appropriate, you will not be charged for this service.\"    Patient has given verbal consent for Video visit? Yes  How would you like to obtain your AVS? MyChart  If you are dropped from the video visit, the video invite should be resent to: Text to cell phone: [phone]  Will anyone else be joining your video visit? No    PROVIDE NOTES:  Urology Consult History and Physical  [facility]  Name: Aren Emanuel    MRN: [id number]   YOB: 2000       We were asked to see Aren Emanuel at the request of Dr. Montero for evaluation and treatment of History of urinary retention .        Chief Complaint:   History of urinary retention     History is obtained from the patient            History of Present Illness:   Aren Emanuel is a 20 year old male who is being seen for evaluation of history of urinary retention.     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    He reports a lot of PTSD related to cystoscopy    Urination currently ok in the morning and midday ok,   Drinks a galloon of water daily  Frequency is worse in the evening  Stream is normal most of the time with good and normal flow  End of his stream seems to come before he is completely empty  He does not need to push or strain  Notes some split stream after intercourse    No further gross hematuria              Past Medical History:     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)      Anxiety      Attention deficit disorder with hyperactivity(314.01)      Bipolar I disorder, most recent episode (or current) unspecified      Cannabis abuse      Depression      Hernia, abdominal      Prostate infection 10/2019            Past Surgical History:     Past Surgical History:   Procedure Laterality Date     C CONTINENT DIVERSION,W/BOWEL ANASTOMOSIS       CYSTOSCOPY       TONSILLECTOMY       XR WRIST SURGERY JAMES RIGHT  2017            Social History:     Social History     Tobacco Use     Smoking status: Light Tobacco Smoker     Types: Cigarettes     Smokeless tobacco: Former User   Substance Use Topics     Alcohol use: Yes       History   Smoking Status     Light Tobacco Smoker     Types: Cigarettes   Smokeless Tobacco     Former User            Family History:     Family History   Problem Relation Age of Onset     Depression Mother      Bipolar Disorder Mother      Hyperlipidemia Father      Seizure Disorder Sister      Depression Brother         estranged     Other - See Comments Brother         PTDS     Depression Sister      Anxiety Disorder Sister               Allergies:   No Known Allergies         Medications:  "    Current Outpatient Medications   Medication Sig     buPROPion HCl (WELLBUTRIN PO)      QUEtiapine Fumarate (SEROQUEL PO)      triamcinolone (KENALOG) 0.1 % cream Apply sparingly to affected area two times daily for 7 days.     VITAMIN D, CHOLECALCIFEROL, PO Take 2,000 Units by mouth daily     No current facility-administered medications for this visit.              Review of Systems:     Skin: negative  Eyes: negative  Ears/Nose/Throat: negative  Respiratory: No shortness of breath, dyspnea on exertion, cough, or hemoptysis  Cardiovascular: negative  Gastrointestinal: as above  Genitourinary: as above  Musculoskeletal: negative  Neurologic: negative  Psychiatric: negative  Hematologic/Lymphatic/Immunologic: negative  Endocrine: negative          Physical Exam:       PHYSICAL EXAM  Patient is a 20 year old  male   Vitals: Height 1.676 m (5' 6\"), weight 68 kg (150 lb).  Body mass index is 24.21 kg/m .  General Appearance Adult:   Alert, no acute distress, oriented  HENT: throat/mouth:normal, good dentition  Lungs: no respiratory distress, or pursed lip breathing  Heart: No obvious jugular venous distension present  Abdomen: non - distended  Musculoskeltal: extremities normal, no peripheral edema  Skin: no suspicious lesions or rashes  Neuro: Alert, oriented, speech and mentation normal  Psych: affect and mood normal  Gait: Normal           Data:   All laboratory data reviewed:    UA RESULTS:  Recent Labs   Lab Test 08/31/18  1135   COLOR Clarissa*   APPEARANCE Slightly Cloudy*   URINEGLC Neg   URINEBILI Small*   URINEKETONE Neg   UBLD Neg   URINEPH 6.0   UROBILINOGEN 0.2   NITRITE Neg     Lab Results   Component Value Date    CR 0.81 06/28/2016               Impression and Plan:   Impression:   20-year-old man with history of pelvic floor dysfunction      Plan:   Pelvic floor dysfunction  -We discussed that his symptoms are consistent with pelvic floor dysfunction and that he would be best served by pursuing pelvic " floor physical therapy  -I reviewed his CT scan from the outside institution which had noted prostatomegaly, on my interpretation it appears that he has significantly enlarged and full seminal vesicles and a normal-sized prostate.  -No further work-up or intervention is needed at this time given his prior past normal cystoscopy     Thank you for the kind consultation.    Time spent: 30 minutes of which >50% was spent counseling.    Jai Baez MD   Urology  [facility]  [facility] Phone: [phone]  [facility] Phone: [phone]       Video-Visit Details    Type of service:  Video Visit    Video Start Time: 4:30 P  Video End Time: 5:00 PM    Originating Location (pt. Location): Home    Distant Location (provider location):  [facility] [place]     Platform used for Video Visit: Madan Baez MD

## 2020-07-15 NOTE — TELEPHONE ENCOUNTER
This message given to rooming staff.  ROMARIO Wilson RN       Health Call Center    Phone Message    May a detailed message be left on voicemail: yes     Reason for Call: Other: Aren is returning a voicemail message regarding his virtual appointment today 7/15/20 at 4:30pm with Dr. Baez. Please give him a call back at your earliest convenience. Thank you     Action Taken: Message routed to:  [facility] Urology    Travel Screening: Not Applicable

## 2020-08-05 ENCOUNTER — TELEPHONE (OUTPATIENT)
Dept: SURGERY | Facility: PHYSICIAN GROUP | Age: 20
End: 2020-08-05

## 2020-08-05 DIAGNOSIS — Z11.59 ENCOUNTER FOR SCREENING FOR OTHER VIRAL DISEASES: Primary | ICD-10-CM

## 2020-08-05 PROBLEM — K42.9 UMBILICAL HERNIA: Status: ACTIVE | Noted: 2020-08-05

## 2020-08-05 PROBLEM — K40.20 BILATERAL INGUINAL HERNIA: Status: ACTIVE | Noted: 2020-08-05

## 2020-08-05 NOTE — TELEPHONE ENCOUNTER
Type of surgery: Laparoscopic bilateral inguinal hernia repair and umbilical hernia repair  Location of surgery: [facility]  Date and time of surgery: 8/26/20 at 12:30pm  Surgeon: Dr. Alisha Montero  Pre-Op Appt Date: Patient to schedule  Post-Op Appt Date: Patient to schedule   Packet sent out: Yes  Pre-cert/Authorization completed:  Not Applicable  Date: 8/5/20

## 2020-08-12 PROBLEM — R10.2 PELVIC PAIN IN MALE: Status: RESOLVED | Noted: 2020-05-06 | Resolved: 2020-08-12

## 2020-08-12 NOTE — PROGRESS NOTES
Aren DAMIEN Martinezad was seen 2 times for evaluation and treatment.  Patient did not return for further treatment and current status is unknown.  Due to short treatment duration, no objective or functional changes were made.  Please see goal flow sheet from episode noted date below and initial evaluation for further information.    Linked Episodes   Type: Episode: Status: Noted: Resolved: Last update: Updated by:   PHYSICAL THERAPY pelvic health 5/6/2020 Active 5/6/2020 5/20/2020 11:23 AM Christina Le, PT      Comments:

## 2020-08-23 DIAGNOSIS — Z11.59 ENCOUNTER FOR SCREENING FOR OTHER VIRAL DISEASES: ICD-10-CM

## 2020-08-23 PROCEDURE — U0003 INFECTIOUS AGENT DETECTION BY NUCLEIC ACID (DNA OR RNA); SEVERE ACUTE RESPIRATORY SYNDROME CORONAVIRUS 2 (SARS-COV-2) (CORONAVIRUS DISEASE [COVID-19]), AMPLIFIED PROBE TECHNIQUE, MAKING USE OF HIGH THROUGHPUT TECHNOLOGIES AS DESCRIBED BY CMS-2020-01-R: HCPCS | Performed by: SURGERY

## 2020-08-25 LAB
SARS-COV-2 RNA SPEC QL NAA+PROBE: NOT DETECTED
SPECIMEN SOURCE: NORMAL

## 2020-08-26 ENCOUNTER — ANESTHESIA (OUTPATIENT)
Dept: SURGERY | Facility: CLINIC | Age: 20
End: 2020-08-26
Payer: COMMERCIAL

## 2020-08-26 ENCOUNTER — APPOINTMENT (OUTPATIENT)
Dept: SURGERY | Facility: PHYSICIAN GROUP | Age: 20
End: 2020-08-26
Payer: COMMERCIAL

## 2020-08-26 ENCOUNTER — OFFICE VISIT (OUTPATIENT)
Dept: INTERNAL MEDICINE | Facility: CLINIC | Age: 20
End: 2020-08-26
Payer: COMMERCIAL

## 2020-08-26 ENCOUNTER — ANESTHESIA EVENT (OUTPATIENT)
Dept: SURGERY | Facility: CLINIC | Age: 20
End: 2020-08-26
Payer: COMMERCIAL

## 2020-08-26 ENCOUNTER — HOSPITAL ENCOUNTER (OUTPATIENT)
Facility: CLINIC | Age: 20
Discharge: HOME | End: 2020-08-26
Attending: SURGERY | Admitting: SURGERY
Payer: COMMERCIAL

## 2020-08-26 ENCOUNTER — SURGERY (OUTPATIENT)
Age: 20
End: 2020-08-26
Payer: COMMERCIAL

## 2020-08-26 VITALS
DIASTOLIC BLOOD PRESSURE: 71 MMHG | TEMPERATURE: 98.1 F | SYSTOLIC BLOOD PRESSURE: 108 MMHG | BODY MASS INDEX: 24.2 KG/M2 | OXYGEN SATURATION: 99 % | HEART RATE: 59 BPM | WEIGHT: 150.6 LBS | RESPIRATION RATE: 16 BRPM | HEIGHT: 66 IN

## 2020-08-26 VITALS
DIASTOLIC BLOOD PRESSURE: 50 MMHG | HEART RATE: 62 BPM | RESPIRATION RATE: 16 BRPM | OXYGEN SATURATION: 99 % | BODY MASS INDEX: 24.53 KG/M2 | WEIGHT: 152 LBS | SYSTOLIC BLOOD PRESSURE: 94 MMHG | TEMPERATURE: 98.1 F

## 2020-08-26 DIAGNOSIS — K42.9 UMBILICAL HERNIA: ICD-10-CM

## 2020-08-26 DIAGNOSIS — K40.20 BILATERAL INGUINAL HERNIA: ICD-10-CM

## 2020-08-26 DIAGNOSIS — K42.9 UMBILICAL HERNIA WITHOUT OBSTRUCTION AND WITHOUT GANGRENE: ICD-10-CM

## 2020-08-26 DIAGNOSIS — K40.20 BILATERAL INGUINAL HERNIA WITHOUT OBSTRUCTION OR GANGRENE, RECURRENCE NOT SPECIFIED: ICD-10-CM

## 2020-08-26 DIAGNOSIS — Z01.818 PREOP GENERAL PHYSICAL EXAM: Primary | ICD-10-CM

## 2020-08-26 DIAGNOSIS — F31.9 BIPOLAR I DISORDER (H): ICD-10-CM

## 2020-08-26 DIAGNOSIS — G89.18 POST-OP PAIN: Primary | ICD-10-CM

## 2020-08-26 DIAGNOSIS — F90.9 ATTENTION DEFICIT HYPERACTIVITY DISORDER (ADHD), UNSPECIFIED ADHD TYPE: ICD-10-CM

## 2020-08-26 PROCEDURE — 37000008 ZZH ANESTHESIA TECHNICAL FEE, 1ST 30 MIN: Performed by: SURGERY

## 2020-08-26 PROCEDURE — 37000009 ZZH ANESTHESIA TECHNICAL FEE, EACH ADDTL 15 MIN: Performed by: SURGERY

## 2020-08-26 PROCEDURE — 71000013 ZZH RECOVERY PHASE 1 LEVEL 1 EA ADDTL HR: Performed by: SURGERY

## 2020-08-26 PROCEDURE — 49650 LAP ING HERNIA REPAIR INIT: CPT | Mod: 50 | Performed by: PHYSICIAN ASSISTANT

## 2020-08-26 PROCEDURE — 36000056 ZZH SURGERY LEVEL 3 1ST 30 MIN: Performed by: SURGERY

## 2020-08-26 PROCEDURE — 25800030 ZZH RX IP 258 OP 636: Performed by: NURSE ANESTHETIST, CERTIFIED REGISTERED

## 2020-08-26 PROCEDURE — 25000128 H RX IP 250 OP 636: Performed by: NURSE ANESTHETIST, CERTIFIED REGISTERED

## 2020-08-26 PROCEDURE — 49585 ZZHC REPAIR UMBILICAL HERN,5+Y/O,REDUC: CPT | Mod: 51 | Performed by: SURGERY

## 2020-08-26 PROCEDURE — 25000128 H RX IP 250 OP 636: Performed by: ANESTHESIOLOGY

## 2020-08-26 PROCEDURE — 71000012 ZZH RECOVERY PHASE 1 LEVEL 1 FIRST HR: Performed by: SURGERY

## 2020-08-26 PROCEDURE — 25000132 ZZH RX MED GY IP 250 OP 250 PS 637: Performed by: PHYSICIAN ASSISTANT

## 2020-08-26 PROCEDURE — 40000170 ZZH STATISTIC PRE-PROCEDURE ASSESSMENT II: Performed by: SURGERY

## 2020-08-26 PROCEDURE — 25000125 ZZHC RX 250: Performed by: NURSE ANESTHETIST, CERTIFIED REGISTERED

## 2020-08-26 PROCEDURE — 25000566 ZZH SEVOFLURANE, EA 15 MIN: Performed by: SURGERY

## 2020-08-26 PROCEDURE — 49585 ZZHC REPAIR UMBILICAL HERN,5+Y/O,REDUC: CPT | Mod: AS | Performed by: PHYSICIAN ASSISTANT

## 2020-08-26 PROCEDURE — C1781 MESH (IMPLANTABLE): HCPCS | Performed by: SURGERY

## 2020-08-26 PROCEDURE — 71000027 ZZH RECOVERY PHASE 2 EACH 15 MINS: Performed by: SURGERY

## 2020-08-26 PROCEDURE — 25000125 ZZHC RX 250: Performed by: SURGERY

## 2020-08-26 PROCEDURE — 36000058 ZZH SURGERY LEVEL 3 EA 15 ADDTL MIN: Performed by: SURGERY

## 2020-08-26 PROCEDURE — 27210794 ZZH OR GENERAL SUPPLY STERILE: Performed by: SURGERY

## 2020-08-26 PROCEDURE — 99214 OFFICE O/P EST MOD 30 MIN: CPT | Performed by: PHYSICIAN ASSISTANT

## 2020-08-26 PROCEDURE — 49650 LAP ING HERNIA REPAIR INIT: CPT | Mod: 50 | Performed by: SURGERY

## 2020-08-26 DEVICE — MESH PROGRIP LAPAROSCOPIC 5.9X3.9" PARIETEX SELF-FIX LPG1510: Type: IMPLANTABLE DEVICE | Site: INGUINAL | Status: FUNCTIONAL

## 2020-08-26 RX ORDER — VENLAFAXINE HYDROCHLORIDE 75 MG/1
CAPSULE, EXTENDED RELEASE ORAL
COMMUNITY
Start: 2020-07-31 | End: 2022-02-22

## 2020-08-26 RX ORDER — ALBUTEROL SULFATE 0.83 MG/ML
2.5 SOLUTION RESPIRATORY (INHALATION) EVERY 4 HOURS PRN
Status: DISCONTINUED | OUTPATIENT
Start: 2020-08-26 | End: 2020-08-26 | Stop reason: HOSPADM

## 2020-08-26 RX ORDER — HYDROMORPHONE HYDROCHLORIDE 1 MG/ML
.3-.5 INJECTION, SOLUTION INTRAMUSCULAR; INTRAVENOUS; SUBCUTANEOUS EVERY 10 MIN PRN
Status: DISCONTINUED | OUTPATIENT
Start: 2020-08-26 | End: 2020-08-26 | Stop reason: HOSPADM

## 2020-08-26 RX ORDER — OXYCODONE AND ACETAMINOPHEN 5; 325 MG/1; MG/1
1 TABLET ORAL
Status: COMPLETED | OUTPATIENT
Start: 2020-08-26 | End: 2020-08-26

## 2020-08-26 RX ORDER — ONDANSETRON 2 MG/ML
4 INJECTION INTRAMUSCULAR; INTRAVENOUS EVERY 30 MIN PRN
Status: DISCONTINUED | OUTPATIENT
Start: 2020-08-26 | End: 2020-08-26 | Stop reason: HOSPADM

## 2020-08-26 RX ORDER — FENTANYL CITRATE 50 UG/ML
25-50 INJECTION, SOLUTION INTRAMUSCULAR; INTRAVENOUS
Status: DISCONTINUED | OUTPATIENT
Start: 2020-08-26 | End: 2020-08-26 | Stop reason: HOSPADM

## 2020-08-26 RX ORDER — LIDOCAINE HYDROCHLORIDE 20 MG/ML
INJECTION, SOLUTION INFILTRATION; PERINEURAL PRN
Status: DISCONTINUED | OUTPATIENT
Start: 2020-08-26 | End: 2020-08-26

## 2020-08-26 RX ORDER — CEFAZOLIN SODIUM 2 G/100ML
2 INJECTION, SOLUTION INTRAVENOUS
Status: DISCONTINUED | OUTPATIENT
Start: 2020-08-26 | End: 2020-08-26 | Stop reason: HOSPADM

## 2020-08-26 RX ORDER — SODIUM CHLORIDE, SODIUM LACTATE, POTASSIUM CHLORIDE, CALCIUM CHLORIDE 600; 310; 30; 20 MG/100ML; MG/100ML; MG/100ML; MG/100ML
INJECTION, SOLUTION INTRAVENOUS CONTINUOUS PRN
Status: DISCONTINUED | OUTPATIENT
Start: 2020-08-26 | End: 2020-08-26

## 2020-08-26 RX ORDER — ONDANSETRON 4 MG/1
4 TABLET, ORALLY DISINTEGRATING ORAL EVERY 30 MIN PRN
Status: DISCONTINUED | OUTPATIENT
Start: 2020-08-26 | End: 2020-08-26 | Stop reason: HOSPADM

## 2020-08-26 RX ORDER — MAGNESIUM HYDROXIDE 1200 MG/15ML
LIQUID ORAL PRN
Status: DISCONTINUED | OUTPATIENT
Start: 2020-08-26 | End: 2020-08-26 | Stop reason: HOSPADM

## 2020-08-26 RX ORDER — DEXAMETHASONE SODIUM PHOSPHATE 4 MG/ML
INJECTION, SOLUTION INTRA-ARTICULAR; INTRALESIONAL; INTRAMUSCULAR; INTRAVENOUS; SOFT TISSUE PRN
Status: DISCONTINUED | OUTPATIENT
Start: 2020-08-26 | End: 2020-08-26

## 2020-08-26 RX ORDER — AMOXICILLIN 250 MG
1 CAPSULE ORAL 2 TIMES DAILY
Qty: 15 TABLET | Refills: 0 | Status: SHIPPED | OUTPATIENT
Start: 2020-08-26 | End: 2020-09-02

## 2020-08-26 RX ORDER — MEPERIDINE HYDROCHLORIDE 25 MG/ML
12.5 INJECTION INTRAMUSCULAR; INTRAVENOUS; SUBCUTANEOUS
Status: DISCONTINUED | OUTPATIENT
Start: 2020-08-26 | End: 2020-08-26 | Stop reason: HOSPADM

## 2020-08-26 RX ORDER — BUPIVACAINE HYDROCHLORIDE AND EPINEPHRINE 5; 5 MG/ML; UG/ML
INJECTION, SOLUTION EPIDURAL; INTRACAUDAL; PERINEURAL
Status: DISCONTINUED
Start: 2020-08-26 | End: 2020-08-26 | Stop reason: HOSPADM

## 2020-08-26 RX ORDER — CEFAZOLIN SODIUM 1 G/3ML
1 INJECTION, POWDER, FOR SOLUTION INTRAMUSCULAR; INTRAVENOUS SEE ADMIN INSTRUCTIONS
Status: DISCONTINUED | OUTPATIENT
Start: 2020-08-26 | End: 2020-08-26 | Stop reason: HOSPADM

## 2020-08-26 RX ORDER — OXYCODONE AND ACETAMINOPHEN 5; 325 MG/1; MG/1
1-2 TABLET ORAL EVERY 4 HOURS PRN
Qty: 18 TABLET | Refills: 0 | Status: SHIPPED | OUTPATIENT
Start: 2020-08-26 | End: 2020-12-15

## 2020-08-26 RX ORDER — ONDANSETRON 2 MG/ML
INJECTION INTRAMUSCULAR; INTRAVENOUS PRN
Status: DISCONTINUED | OUTPATIENT
Start: 2020-08-26 | End: 2020-08-26

## 2020-08-26 RX ORDER — NALOXONE HYDROCHLORIDE 0.4 MG/ML
.1-.4 INJECTION, SOLUTION INTRAMUSCULAR; INTRAVENOUS; SUBCUTANEOUS
Status: DISCONTINUED | OUTPATIENT
Start: 2020-08-26 | End: 2020-08-26 | Stop reason: HOSPADM

## 2020-08-26 RX ORDER — FENTANYL CITRATE 50 UG/ML
INJECTION, SOLUTION INTRAMUSCULAR; INTRAVENOUS PRN
Status: DISCONTINUED | OUTPATIENT
Start: 2020-08-26 | End: 2020-08-26

## 2020-08-26 RX ORDER — EPHEDRINE SULFATE 50 MG/ML
INJECTION, SOLUTION INTRAMUSCULAR; INTRAVENOUS; SUBCUTANEOUS PRN
Status: DISCONTINUED | OUTPATIENT
Start: 2020-08-26 | End: 2020-08-26

## 2020-08-26 RX ORDER — PROPOFOL 10 MG/ML
INJECTION, EMULSION INTRAVENOUS PRN
Status: DISCONTINUED | OUTPATIENT
Start: 2020-08-26 | End: 2020-08-26

## 2020-08-26 RX ORDER — SODIUM CHLORIDE, SODIUM LACTATE, POTASSIUM CHLORIDE, CALCIUM CHLORIDE 600; 310; 30; 20 MG/100ML; MG/100ML; MG/100ML; MG/100ML
INJECTION, SOLUTION INTRAVENOUS CONTINUOUS
Status: DISCONTINUED | OUTPATIENT
Start: 2020-08-26 | End: 2020-08-26 | Stop reason: HOSPADM

## 2020-08-26 RX ORDER — HYDRALAZINE HYDROCHLORIDE 20 MG/ML
2.5-5 INJECTION INTRAMUSCULAR; INTRAVENOUS EVERY 10 MIN PRN
Status: DISCONTINUED | OUTPATIENT
Start: 2020-08-26 | End: 2020-08-26 | Stop reason: HOSPADM

## 2020-08-26 RX ORDER — LIDOCAINE HYDROCHLORIDE 10 MG/ML
INJECTION, SOLUTION EPIDURAL; INFILTRATION; INTRACAUDAL; PERINEURAL
Status: DISCONTINUED
Start: 2020-08-26 | End: 2020-08-26 | Stop reason: HOSPADM

## 2020-08-26 RX ORDER — PROPOFOL 10 MG/ML
INJECTION, EMULSION INTRAVENOUS CONTINUOUS PRN
Status: DISCONTINUED | OUTPATIENT
Start: 2020-08-26 | End: 2020-08-26

## 2020-08-26 RX ORDER — QUETIAPINE FUMARATE 100 MG/1
200 TABLET, FILM COATED ORAL
COMMUNITY
Start: 2020-05-20 | End: 2022-02-22

## 2020-08-26 RX ADMIN — FENTANYL CITRATE 50 MCG: 0.05 INJECTION, SOLUTION INTRAMUSCULAR; INTRAVENOUS at 15:27

## 2020-08-26 RX ADMIN — BUPIVACAINE HYDROCHLORIDE AND EPINEPHRINE BITARTRATE 28 ML: 5; .005 INJECTION, SOLUTION EPIDURAL; INTRACAUDAL; PERINEURAL at 14:13

## 2020-08-26 RX ADMIN — FENTANYL CITRATE 100 MCG: 50 INJECTION, SOLUTION INTRAMUSCULAR; INTRAVENOUS at 13:07

## 2020-08-26 RX ADMIN — Medication 5 MG: at 14:07

## 2020-08-26 RX ADMIN — MIDAZOLAM 2 MG: 1 INJECTION INTRAMUSCULAR; INTRAVENOUS at 13:05

## 2020-08-26 RX ADMIN — PROPOFOL 200 MG: 10 INJECTION, EMULSION INTRAVENOUS at 13:07

## 2020-08-26 RX ADMIN — OXYCODONE HYDROCHLORIDE AND ACETAMINOPHEN 1 TABLET: 5; 325 TABLET ORAL at 15:26

## 2020-08-26 RX ADMIN — SODIUM CHLORIDE 1000 ML: 900 IRRIGANT IRRIGATION at 13:23

## 2020-08-26 RX ADMIN — SUGAMMADEX 140 MG: 100 INJECTION, SOLUTION INTRAVENOUS at 14:15

## 2020-08-26 RX ADMIN — SODIUM CHLORIDE, POTASSIUM CHLORIDE, SODIUM LACTATE AND CALCIUM CHLORIDE: 600; 310; 30; 20 INJECTION, SOLUTION INTRAVENOUS at 13:49

## 2020-08-26 RX ADMIN — SODIUM CHLORIDE, POTASSIUM CHLORIDE, SODIUM LACTATE AND CALCIUM CHLORIDE: 600; 310; 30; 20 INJECTION, SOLUTION INTRAVENOUS at 12:59

## 2020-08-26 RX ADMIN — ROCURONIUM BROMIDE 50 MG: 10 INJECTION INTRAVENOUS at 13:07

## 2020-08-26 RX ADMIN — DEXAMETHASONE SODIUM PHOSPHATE 4 MG: 4 INJECTION, SOLUTION INTRA-ARTICULAR; INTRALESIONAL; INTRAMUSCULAR; INTRAVENOUS; SOFT TISSUE at 13:07

## 2020-08-26 RX ADMIN — LIDOCAINE HYDROCHLORIDE 80 MG: 20 INJECTION, SOLUTION INFILTRATION; PERINEURAL at 13:07

## 2020-08-26 RX ADMIN — PROPOFOL 50 MCG/KG/MIN: 10 INJECTION, EMULSION INTRAVENOUS at 13:08

## 2020-08-26 RX ADMIN — ONDANSETRON 4 MG: 2 INJECTION INTRAMUSCULAR; INTRAVENOUS at 13:07

## 2020-08-26 RX ADMIN — Medication 10 MG: at 13:29

## 2020-08-26 RX ADMIN — FENTANYL CITRATE 50 MCG: 0.05 INJECTION, SOLUTION INTRAMUSCULAR; INTRAVENOUS at 15:16

## 2020-08-26 RX ADMIN — Medication 10 MG: at 14:04

## 2020-08-26 ASSESSMENT — MIFFLIN-ST. JEOR: SCORE: 1630.87

## 2020-08-26 ASSESSMENT — LIFESTYLE VARIABLES: TOBACCO_USE: 0

## 2020-08-26 ASSESSMENT — ENCOUNTER SYMPTOMS
DYSRHYTHMIAS: 0
SEIZURES: 0

## 2020-08-26 NOTE — OP NOTE
General Surgery Operative Note    PREOPERATIVE DIAGNOSIS:  Bilateral inguinal hernias, umbilical hernia    POSTOPERATIVE DIAGNOSIS:  same    PROCEDURE:    1. Laparoscopic repair of bilateral inguinal hernias  2. Umbilical hernia repair    ANESTHESIA:  General.    SURGEON:  Alisha Montero MD    ASSISTANT:   Clover Godinez PA-C  The physician s assistant was medically necessary for their expertise in camera management, suctioning and retraction.    ESTIMATED BLOOD LOSS:  5ml    INTRAOPERATIVE FINDINGS:  Bilateral small indirect inguinal hernias, 1cm umbilical hernia    OPERATIVE INDICATIONS: Mr. Emanuel has a symptomatic right inguinal hernia as well as a small intermittently symptomatic left inguinal hernia and a small umbilical hernia. Options were discussed and it was elected to proceed with laparoscopic repair of the inguinal hernias and an open repair of his umbilical hernia. Potential risks of bleeding, infection, neurovascular injury to the vas deferens or testicle, recurrent hernia, chronic pain were all reviewed in detail and he wished to proceed.     DESCRIPTION OF PROCEDURE: The patient was taken to the operating suite and uneventfully endotracheally intubated. Perioperative antibiotics were given. The abdomen and groin were prepped and draped in the usual sterile fashion. Surgeon initiated timeout was performed verifying the correct patient, procedure, site, and surgeon. All in the room were in agreement. A mixture of lidocaine and marcaine was injected in the infra umbilical area.    A curvilinear incision was made at the underside of the umbilicus. Bovie cautery was used to get through the subcutaneous tissue. The anterior rectus sheath was encountered and sharply incised. The rectus muscle was retracted laterally and the dissecting balloon was passed along the posterior rectus sheath toward the pubic bone. The balloon was filled with air with the hand pump under direct visualization. The preperitoneal  space was dissected nicely and the baloon held in place for 30 seconds for hemostasis. The dissecting balloon was then removed and our working balloon was placed and positioned. Local was injected and Two 5 mm trocars were then placed along the lower midline under direct laparoscopic visualization. We began our dissection on the right side. Using combination of sharp and blunt dissection, a plane was created behind the abdominal wall and the underlying peritoneum. This was done in a blunt and atraumatic fashion. The inferior epigastric vessels were visualized running along the underside of the abdominal wall.  The epigastric vessels were followed down until we were able to identify the internal ring. The spermatic cord was then meticulously dissected preserving all of the nerve and blood vessels. A  small Indirect hernia was found and reduced without difficulty. Coopers ligament was then cleared without significant bleeding. The dissection was continued until we had an excellent space in the preperitoneal area. We then performed the same procedure on the left side. On the left there was a small indirect inguinal hernia noted. The peritoneum was carefully swept down from the cord structures without injury to the spermatic cord. There was minimal bleeding. There were no holes created in the peritoneum on either side.   A piece of progrip laparoscopic mesh was then placed within this space, first on the left side and then a 2nd piece of laparoscopic progrip mesh was placed on the right side. The mesh was held in excellent position under direct visualization until the insufflation was completely out of the preperitoneal space.  All trocars were removed under direct visualization. The anterior fascia was closed with an 0 Vicryl in the figure of 8 fashion.   We then directed our attention to his umbilical hernia. Cautery was used to divide the umbilical stalk and expose the umbilical hernia. The hernia sac was dissected  free from the umbilical stalk. There was a 1cm fascia defect at the base of the umbilicus. The edges were cleared and the defect was closed with several interrupted 0 vicryl sutures. The umbilicus was tacked to the fascia with 3-0 vicryl sutures. The deep dermal skin was closed with interrupted 3-0 vicryl sutures and the skin was closed with a 4.0 Monocryl in a subcuticular fashion.   The incisions were completely dry without any bleeding.   Steri-Strips were applied. All needle, sponge and instrument counts were correct.  A debriefing was performed verifying the correct procedure, sponge/instrument count, specimen indetification, and blood loss. The patient tolerated the procedure well and was taken to the recovery room in stable condition.    Alisha Montero MD

## 2020-08-26 NOTE — BRIEF OP NOTE
General Surgery Brief Operative Note    Pre-operative diagnosis: Bilateral inguinal hernia [K40.20]  Umbilical hernia [K42.9]   Post-operative diagnosis Same   Procedure: Procedure(s):  LAPAROSCOPIC BILATERAL INGUINAL HERNIA REPAIR WITH MESH  OPEN UMBILICAL HERNIA REPAIR (primary)   Surgeon(s), Assistant(s): Surgeon(s) and Role:  Panel 1:     * Alisha Montero MD - Primary     * Clover Godinez PA-C - Assisting  Panel 2:     * Alisha Montero MD - Primary     * Clover Godinez PA-C - Assisting   Estimated blood loss: 5 mL   Drains: None   Specimens: * No specimens in log *   Findings: See postop diagnosis   Condition: Stable   Comments:      Clover Godinez PA-C See dictated operative report for full details

## 2020-08-26 NOTE — PATIENT INSTRUCTIONS

## 2020-08-26 NOTE — ANESTHESIA PREPROCEDURE EVALUATION
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 Authorizing Provider   QUEtiapine (SEROQUEL) 100 MG tablet 200 mg 20   Reported, Patient   triamcinolone (KENALOG) 0.1 % cream Apply sparingly to affected area two times daily for 7 days. 18   Zuleyma Faith PA   venlafaxine (EFFEXOR-XR) 75 MG 24 hr capsule TAKE 1 CAPSULE BY MOUTH IN THE MORNING 20   Reported, Patient   VITAMIN D, CHOLECALCIFEROL, PO Take 2,000 Units by mouth daily    Reported, Patient     Anesthesia Evaluation     .             ROS/MED HX    ENT/Pulmonary:      (-) tobacco use, asthma and sleep apnea   Neurologic:      (-) seizures, CVA and migraines   Cardiovascular:        (-) hypertension, CAD, ARMIJO, arrhythmias, valvular problems/murmurs and dyslipidemia   METS/Exercise Tolerance:  >4 METS   Hematologic:        (-) history of blood clots, anemia and other hematologic disorder   Musculoskeletal:        (-) arthritis   GI/Hepatic:        (-) GERD and liver disease   Renal/Genitourinary:      (-) renal disease and nephrolithiasis   Endo:      (-) Type II DM, thyroid disease and obesity   Psychiatric:     (+)  "psychiatric history anxiety, depression and bipolar (ADHD)      Infectious Disease:        (-) Recent Fever   Malignancy:         Other: Comment: Hx substance abuse  Cannabis use                           Physical Exam  Normal systems: cardiovascular, pulmonary and dental    Airway   Mallampati: II  TM distance: >3 FB  Neck ROM: full    Dental     Cardiovascular   Rhythm and rate: regular and normal      Pulmonary    breath sounds clear to auscultation              Preop Vitals  BP Readings from Last 3 Encounters:   08/26/20 94/50   07/10/20 120/80   06/27/20 104/74    Pulse Readings from Last 3 Encounters:   08/26/20 62   07/10/20 70   06/27/20 50      Resp Readings from Last 3 Encounters:   08/26/20 16   06/27/20 16   08/31/18 20    SpO2 Readings from Last 3 Encounters:   08/26/20 99%   02/26/19 98%   08/28/17 98%      Temp Readings from Last 1 Encounters:   08/26/20 36.7  C (98.1  F) (Temporal)    Ht Readings from Last 1 Encounters:   07/15/20 1.676 m (5' 6\")      Wt Readings from Last 1 Encounters:   08/26/20 68.9 kg (152 lb)    Estimated body mass index is 24.53 kg/m  as calculated from the following:    Height as of 7/15/20: 1.676 m (5' 6\").    Weight as of an earlier encounter on 8/26/20: 68.9 kg (152 lb).       Anesthesia Plan      History & Physical Review      ASA Status:  2 .    NPO Status:  > 8 hours    Plan for General with Propofol induction. Maintenance will be Balanced.    PONV prophylaxis:  Ondansetron (or other 5HT-3)  Propofol infusion        Postoperative Care  Postoperative pain management:  Multi-modal analgesia.      Consents  Anesthetic plan, risks, benefits and alternatives discussed with:  Patient..                 Jaelyn Ardon MD  "

## 2020-08-26 NOTE — ANESTHESIA CARE TRANSFER NOTE
Patient: Aren Emanuel    Procedure(s):  LAPAROSCOPIC BILATERAL INGUINAL HERNIA REPAIR WITH MESH  OPEN UMBILICAL HERNIA REPAIR    Diagnosis: Bilateral inguinal hernia [K40.20]  Umbilical hernia [K42.9]  Diagnosis Additional Information: No value filed.    Anesthesia Type:   General     Note:  Airway :Face Mask  Patient transferred to:Phase II  Comments: Neuromuscular blockade reversed after TOF 4/4, spontaneous respirations, adequate tidal volumes, followed commands to voice, oropharynx suctioned with soft flexible catheter, extubated atraumatically, extubated with suction, airway patent after extubation.  Oxygen via facemask at 4 liters per minute to PACU. After extubation, patient remained in OR for 14 minutes until transport to PACU due to standard hospital COVID-19 precautions, Oxygen tubing connected to wall O2 in PACU, SpO2, NiBP, and EKG monitors and alarms on and functioning, Patsy Hugger warmer connected to patient gown.   Handoff Report: Identifed the Patient, Identified the Reponsible Provider, Reviewed the pertinent medical history, Discussed the surgical course, Reviewed Intra-OP anesthesia mangement and issues during anesthesia, Set expectations for post-procedure period and Allowed opportunity for questions and acknowledgement of understanding      Vitals: (Last set prior to Anesthesia Care Transfer)    CRNA VITALS  8/26/2020 1405 - 8/26/2020 1440      8/26/2020             Resp Rate (set):  10                Electronically Signed By: ANDRES Bailey CRNA  August 26, 2020  2:40 PM

## 2020-08-26 NOTE — PROGRESS NOTES
[address] [address]  Phone: [phone]  Primary Provider: No Ref-Primary, Physician  Pre-op Performing Provider: YUE TIM    PREOPERATIVE EVALUATION:  Today's date: 8/26/2020    Aren Emanuel is a 20 year old male who presents for a preoperative evaluation.    Surgical Information:  Surgery Details 8/26/2020   Surgery/Procedure: Bilateral inguinal hernia repair   Surgery Location: [facility]   Surgeon: Kylah   Surgery Date: 8/26/20   Time of Surgery: 1:15PM   Where patient plans to recover: At home with family   Additional recovery plan details: N/A     Fax number for surgical facility: Note does not need to be faxed, will be available electronically in Epic.  Type of Anesthesia Anticipated: General    Subjective     HPI related to upcoming procedure: bilateral inguinal hernia and umbilical hernia   Preop Questions 8/26/2020   Have you ever had a heart attack or stroke? No   Have you ever had surgery on your heart or blood vessels, such as a stent placement, a coronary artery bypass, or surgery on an artery in your head, neck, heart, or legs? No   Do you have chest pain with activity? No   Do you have a history of  heart failure? No   Do you currently have a cold, bronchitis or symptoms of other infection? No   Do you have a cough, shortness of breath, or wheezing? No   Do you or anyone in your family have previous history of blood clots? No   Do you or does anyone in your family have a serious bleeding problem such as prolonged bleeding following surgeries or cuts? No   Have you ever had problems with anemia or been told to take iron pills? No   Have you had any abnormal blood loss such as black, tarry or bloody stools? No   Have you ever had a blood transfusion? No   Are you willing to have a blood transfusion if it is medically needed before, during, or after your surgery? Yes   Have you or any of your relatives ever had  problems with anesthesia? No   Do you have sleep apnea, excessive snoring or daytime drowsiness? No   Do you have any artifical heart valves or other implanted medical devices like a pacemaker, defibrillator, or continuous glucose monitor? No   Do you have artificial joints? No   Are you allergic to latex? No     Patient does not have a Health Care Directive or Living Will: Discussed advance care planning with patient; however, patient declined at this time.    [place] ([place] reviewed:  reviewed- no concerns    See problem list for active medical problems.  Problems all longstanding and stable, except as noted/documented.  See ROS for pertinent symptoms related to these conditions.      Review of Systems  CONSTITUTIONAL: NEGATIVE for fever, chills, change in weight  INTEGUMENTARY/SKIN: NEGATIVE for worrisome rashes, moles or lesions  EYES: NEGATIVE for vision changes or irritation  ENT/MOUTH: NEGATIVE for ear, mouth and throat problems  RESP: NEGATIVE for significant cough or SOB  CV: NEGATIVE for chest pain, palpitations or peripheral edema  GI: NEGATIVE for nausea, abdominal pain, heartburn, or change in bowel habits  MUSCULOSKELETAL: NEGATIVE for significant arthralgias or myalgia  NEURO: NEGATIVE for weakness, dizziness or paresthesias  ENDOCRINE: NEGATIVE for temperature intolerance, skin/hair changes  HEME/ALLERGY/IMMUNE: NEGATIVE for bleeding problems  PSYCHIATRIC: NEGATIVE for changes in mood or affect  ROS otherwise negative    Patient Active Problem List    Diagnosis Date Noted     Bilateral inguinal hernia 08/05/2020     Priority: Medium     Added automatically from request for surgery [id number]       Umbilical hernia 08/05/2020     Priority: Medium     Added automatically from request for surgery [id number]       Mood disorder (H) 08/28/2017     Priority: Medium     Cannabis abuse, episodic 08/28/2017     Priority: Medium     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 Provider to review       Bipolar I disorder (H)      Priority: Medium     Health Care Home 08/28/2017     Priority: Low     ACP (advance care planning) 08/28/2017     Priority: Low     Advance Care Planning 8/28/2017: ACP Review of Chart / Resources Provided:  Reviewed chart for advance care plan.  Aren Emanuel has no plan or code status on file. Discussed available resources and provided with information.   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)      Anxiety      Attention deficit disorder with hyperactivity(314.01)      Bipolar I disorder, most recent episode (or current) unspecified      Cannabis abuse      Depression      Hernia, abdominal      Prostate infection 10/2019     Past Surgical History:   Procedure Laterality Date     C CONTINENT DIVERSION,W/BOWEL ANASTOMOSIS       CYSTOSCOPY       TONSILLECTOMY       XR WRIST SURGERY JAMES RIGHT  2017     Current Outpatient Medications   Medication Sig Dispense Refill     QUEtiapine (SEROQUEL) 100 MG tablet 200 mg       triamcinolone (KENALOG) 0.1 % cream Apply sparingly to affected area two times daily for 7 days. 15 g 0     venlafaxine (EFFEXOR-XR) 75 MG 24 hr capsule TAKE 1 CAPSULE BY MOUTH IN THE MORNING       VITAMIN D, CHOLECALCIFEROL, PO Take 2,000 Units by mouth daily         No Known Allergies     Social History     Tobacco Use     Smoking status: Light Tobacco Smoker     Types: Cigarettes     Smokeless tobacco: Former User   Substance Use Topics     Alcohol use: Yes       History   Drug Use     Types: Marijuana            Objective   BP 94/50 (BP Location: Left arm, Patient Position: Chair, Cuff Size: Adult Regular)   Pulse 62   Temp 98.1  F (36.7  C) (Temporal)   Resp 16   Wt 68.9 kg (152 lb)   SpO2 99%   BMI 24.53 kg/m    Physical Exam  GENERAL  APPEARANCE: healthy, alert and no distress  HENT: ear canals and TM's normal and nose and mouth without ulcers or lesions  NECK: no adenopathy, no asymmetry, masses, or scars and thyroid normal to palpation  RESP: lungs clear to auscultation - no rales, rhonchi or wheezes  CV: regular rate and rhythm, normal S1 S2, no S3 or S4 and no murmur, click or rub   ABDOMEN: soft, nontender, no HSM or masses and bowel sounds normal  MS: extremities normal- no gross deformities noted  SKIN: no suspicious lesions or rashes  NEURO: Normal strength and tone, sensory exam grossly normal, mentation intact and speech normal  PSYCH: mentation appears normal and affect normal/bright    See [facility] care everywhere labs done 3/2020  No results for input(s): HGB, PLT, INR, NA, POTASSIUM, CR, A1C in the last 99024 hours.     PRE-OP Diagnostics:  No labs were ordered during this visit.  No EKG required, no history of coronary heart disease, significant arrhythmia, peripheral arterial disease or other structural heart disease.         Assessment & Plan   The proposed surgical procedure is considered INTERMEDIATE risk.    REVISED CARDIAC RISK INDEX  The patient has the following serious cardiovascular risks for perioperative complications:  No serious cardiac risks = 0 points    INTERPRETATION: 0 points: Class I (very low risk - 0.4% complication rate)       Aren was seen today for pre-op exam.    Diagnoses and all orders for this visit:    Preop general physical exam    Bilateral inguinal hernia without obstruction or gangrene, recurrence not specified    Umbilical hernia without obstruction and without gangrene    Bipolar I disorder (H)    Attention deficit hyperactivity disorder (ADHD), unspecified ADHD type        The patient has the following additional risks and recommendations for perioperative complications:      SOCIAL and SUBSTANCE:    - Social concerns that may affect postoperative recovery plan, including bipolar disorder      MEDICATION INSTRUCTIONS:  Patient is to take all scheduled medications on the day of surgery    RECOMMENDATION:  APPROVAL GIVEN to proceed with proposed procedure, without further diagnostic evaluation.    No follow-ups on file.    Signed Electronically by: Marielena Villegas PA-C    Copy of this evaluation report is provided to requesting physician.    [facility] Preop Guidelines    Revised Cardiac Risk Index

## 2020-08-26 NOTE — DISCHARGE INSTRUCTIONS
[facility]  Discharge Instructions: Post-Operative Laparoscopic Inguinal Hernia    ACTIVITY    Take frequent, short walks and increase your activity gradually.      Avoid strenuous physical activity or heavy lifting greater than 15 lbs. for 3-4 weeks.  You may climb stairs.    You may drive without restrictions when you are not using any prescription pain medication and feel comfortable in a car.    You may return to work/school when you are comfortable without any prescription pain medication.    WOUND CARE    You may remove your outer dressing or Band-Aids and shower 48 hours after the surgery.  Pat your incisions dry and leave them open to air.  Re-apply dressing (Band-Aids or gauze/tape) as needed for comfort or drainage.    You may have steri-strips (looks like white tape) on your incision.  You may peel off the steri-strips 2 weeks after your surgery if they have not peeled off on their own.     Do not soak your incisions in a tub or pool for 2 weeks.     Do not apply any lotions, creams, or ointments to your incisions.    A ridge under your incisions is normal and will gradually resolve.    DIET    Start with liquids, then gradually resume your regular diet as tolerated.     Drink plenty of fluids to stay hydrated.    PAIN    Expect some tenderness and discomfort at the incision site(s).  Use the prescribed pain medication at your discretion.  Expect gradual resolution of your pain over several days.    You may take ibuprofen with food (unless you have been told not to) instead of or in addition to your prescribed pain medication.  If you are taking Norco or Percocet, do not take any additional acetaminophen/APAP/Tylenol.    Do not drink alcohol or drive while you are taking pain medications.    You may apply ice to your incisions in 20 minute intervals as needed for the next 48 hours.  After that time, consider switching to heat if you prefer.    EXPECTATIONS    You may notice  air in your scrotum and/or penis after the surgery.  This is due to the gas used during the surgery.  You can expect some swelling and bruising involving the scrotum and/or penis as well as numbness.  These symptoms are expected and should gradually resolve in the next few days.  You may use ice to help with the swelling and try placing a rolled hand towel below your scrotum to help alleviate scrotal discomfort.  If you develop significant testicular or penile pain, please call our office and speak with a nurse.    Pain medications can cause constipation.  Limit use when possible.  Take over the counter stool softener/stimulant, such as Colace or Senna, 1-2 times a day with plenty of water.  You may take a mild over the counter laxative, such as Miralax or a suppository, as needed.  You may take 1 oz. (2 tablespoons) Milk of Magnesia the evening following surgery to encourage bowel movement.  You may discontinue these medications once you are having regular bowel movements and/or are no longer taking your narcotic pain medication.     You may have shoulder or upper back discomfort due to the gas used in surgery.  This is temporary and should resolve in 48-72 hours.  Short, frequent walks may help with this.    FOLLOW UP    Follow up with Dr. Montero in 2 weeks. Call [phone] to schedule an appointment or if you have any concerns. We are located at [address] [place] [address].     CALL OUR OFFICE -[phone] IF YOU HAVE:     Chills or fever above 101 F.    Increased redness, warmth, or drainage at your incisions.    Significant bleeding.    Pain not relieved by your pain medication or rest.    Increasing pain after the first 48 hours.    Any other concerns or questions.    Revised January 2018      Same Day Surgery Discharge Instructions for  Sedation and General Anesthesia       It's not unusual to feel dizzy, light-headed or faint for up to 24 hours after surgery or while taking  pain medication.  If you have these symptoms: sit for a few minutes before standing and have someone assist you when you get up to walk or use the bathroom.      You should rest and relax for the next 24 hours. We recommend you make arrangements to have an adult stay with you for at least 24 hours after your discharge.  Avoid hazardous and strenuous activity.      DO NOT DRIVE any vehicle or operate mechanical equipment for 24 hours following the end of your surgery.  Even though you may feel normal, your reactions may be affected by the medication you have received.      Do not drink alcoholic beverages for 24 hours following surgery.       Slowly progress to your regular diet as you feel able. It's not unusual to feel nauseated and/or vomit after receiving anesthesia.  If you develop these symptoms, drink clear liquids (apple juice, ginger ale, broth, 7-up, etc. ) until you feel better.  If your nausea and vomiting persists for 24 hours, please notify your surgeon.        All narcotic pain medications, along with inactivity and anesthesia, can cause constipation. Drinking plenty of liquids and increasing fiber intake will help.      For any questions of a medical nature, call your surgeon.      Do not make important decisions for 24 hours.      If you had general anesthesia, you may have a sore throat for a couple of days related to the breathing tube used during surgery.  You may use Cepacol lozenges to help with this discomfort.  If it worsens or if you develop a fever, contact your surgeon.       If you feel your pain is not well managed with the pain medications prescribed by your surgeon, please contact your surgeon's office to let them know so they can address your concerns.       CoVid 19 Information    We want to give you information regarding Covid. Please consult your primary care provider with any questions you might have.     Patient who have symptoms (cough, fever, or shortness of breath), need to  isolate for 7 days from when symptoms started OR 72 hours after fever resolves (without fever reducing medications) AND improvement of respiratory symptoms (whichever is longer).      Isolate yourself at home (in own room/own bathroom if possible)    Do Not allow any visitors    Do Not go to work or school    Do Not go to [employer],  centers, shopping, or other public places.    Do Not shake hands.    Avoid close and intimate contact with others (hugging, kissing).    Follow CDC recommendations for household cleaning of frequently touched services.     After the initial 7 days, continue to isolate yourself from household members as much as possible. To continue decrease the risk of community spread and exposure, you and any members of your household should limit activities in public for 14 days after starting home isolation.     You can reference the following CDC link for helpful home isolation/care tips:  https://www.cdc.gov/coronavirus/2019-ncov/downloads/10Things.pdf    Protect Others:    Cover Your Mouth and Nose with a mask, disposable tissue or wash cloth to avoid spreading germs to others.    Wash your hands and face frequently with soap and water    Call Your Primary Doctor If: Breathing difficulty develops or you become worse.    For more information about COVID19 and options for caring for yourself at home, please visit the CDC website at https://www.cdc.gov/coronavirus/2019-ncov/about/steps-when-sick.html  For more options for care at [facility], please visit our website at [email or web address]      **If you have concerns or questions about your procedure,    please contact Dr Montero at  [phone]**

## 2020-08-27 ENCOUNTER — TELEPHONE (OUTPATIENT)
Dept: SURGERY | Facility: CLINIC | Age: 20
End: 2020-08-27

## 2020-08-27 DIAGNOSIS — G89.18 ACUTE POST-OPERATIVE PAIN: Primary | ICD-10-CM

## 2020-08-27 RX ORDER — ACETAMINOPHEN 500 MG
500-1000 TABLET ORAL EVERY 6 HOURS PRN
Qty: 1 BOTTLE | Refills: 0 | Status: SHIPPED | OUTPATIENT
Start: 2020-08-27 | End: 2022-02-22

## 2020-08-27 RX ORDER — CYCLOBENZAPRINE HCL 10 MG
10 TABLET ORAL EVERY 8 HOURS PRN
Qty: 15 TABLET | Refills: 0 | Status: SHIPPED | OUTPATIENT
Start: 2020-08-27 | End: 2020-09-18

## 2020-08-27 RX ORDER — OXYCODONE HYDROCHLORIDE 5 MG/1
5-10 TABLET ORAL EVERY 4 HOURS PRN
Qty: 15 TABLET | Refills: 0 | Status: SHIPPED | OUTPATIENT
Start: 2020-08-27 | End: 2020-08-30

## 2020-08-27 NOTE — TELEPHONE ENCOUNTER
Procedure: Laparoscopic bilateral inguinal hernia repair with mesh; Open umbilical hernia repair    Date:  2020    Surgeon:  Kylah      Patient reporting that the percocet (5-325mg) is not relieving his pain appropriately.  He reports that he is also utilizing ibuprofen and aleve.  However, he reports utilizing 500 mg of ibuprofen, so unsure if he is actually using acetaminophen.    Advised him to take aleve or ibuprofen, but not both.    He is utilizing ice packs.    Taking senna twice daily.     Will discuss pain management with PA and/or Dr. Montero and call patient back.    He verbalized understanding.    Preferred Pharmacy:  [facility] in [place].    Petra Garrett RN-BSN        Surgery - Follow up call    Talked with patient.   Pain management recommendations:    Oxycodone 5 m-2 tabs PO q 4 hrs  Flexeril 10 m tab PO q 8 hrs  Tylenol 500 m tabs PO q 6 hrs  Aleve: 220 m tab PO q 8 hrs    rx sent to [facility] in .    Avelino Alfaro PA-C  Office: [phone]  Pager: [phone]

## 2020-09-18 ENCOUNTER — TELEPHONE (OUTPATIENT)
Dept: SURGERY | Facility: CLINIC | Age: 20
End: 2020-09-18

## 2020-09-18 DIAGNOSIS — G89.18 ACUTE POST-OPERATIVE PAIN: ICD-10-CM

## 2020-09-18 RX ORDER — CYCLOBENZAPRINE HCL 10 MG
5-10 TABLET ORAL EVERY 8 HOURS PRN
Qty: 15 TABLET | Refills: 0 | Status: SHIPPED | OUTPATIENT
Start: 2020-09-18 | End: 2021-12-17

## 2020-09-18 NOTE — TELEPHONE ENCOUNTER
"Procedure:  Laparoscopic bilateral inguinal hernia repair with mesh, open umbilical hernia repair with mesh    Date:  08/26/2020    Surgeon:  Kylah/Cl[facility]      Patient reporting some discomfort/pain with moving. He reports that he was starting to feel really well and so he started doing more physically and he thinks that he over did things.      Pain is not present at rest.  He reports the pain is \"not terrible\" but present and does bother him when moving around.    Pain begins on left side of umbilical area and travels down into left groin.    Informed him that this is probably muscular in nature.  Most likely not any complication from surgery, especially because he does not have discomfort, unless moving around.    He requests refill of flexeril.      He also reports that Dr. Montero wanted to see him for a post op d/t multiple hernias being repaired.    Scheduled him to see Dr. Montero at the end of next week. Will send rx [facility] [place].    Encouraged him to alternate tylenol, ibuprofen, ice, and heat as well as decrease his level of physical activity to allow area to rest and recover.  He agreed to this.    He will call if symptoms become worse and will have him seen in clinic early next week.    Petra Garrett, RN-BSN    "

## 2020-09-28 ENCOUNTER — TELEPHONE (OUTPATIENT)
Dept: SURGERY | Facility: CLINIC | Age: 20
End: 2020-09-28

## 2020-09-28 NOTE — TELEPHONE ENCOUNTER
"Procedure:  Laparoscopic bilateral inguinal hernia repair, Open umbilical hernia repair    Date:  08/26/2020    Surgeon:  Kylah    Patient missed appointment last week because he forgot about it.      He reports that he was feeling better, until Saturday when he was having a cigarette and coughed hard.  He reports since then he has been experiencing some discomfort and he feels is is worth mentioning.    Informed him he probably injured muscles in the area with the forcefulness of the cough.  Recommended ice and ibuprofen and follow up with Dr. Montero per discharge instructions.    He agreed.  Appointment scheduled for this coming Friday.  He \"will write it down this time.\"    He will call PRN.    Petra Garrett RN-BSN    "

## 2020-09-28 NOTE — TELEPHONE ENCOUNTER
Name of caller: Patient    Reason for Call:  Symptoms  Ongoing pain post surgery    Was scheduled for PO Friday, 9/25 but forgot about the appt. Not sure if he should reschedule, so wanting to speak with nurse before scheduling.    Surgeon:  Dr. Montero    Recent Surgery:  Yes.    If yes, when & what type:  8/26    LAPAROSCOPIC BILATERAL INGUINAL HERNIA REPAIR WITH MESH     OPEN UMBILICAL HERNIA REPAIR     Best phone number to reach pt at is: [phone]    Ok to leave a message with medical info? Yes.

## 2020-10-02 ENCOUNTER — OFFICE VISIT (OUTPATIENT)
Dept: SURGERY | Facility: CLINIC | Age: 20
End: 2020-10-02
Payer: COMMERCIAL

## 2020-10-02 DIAGNOSIS — Z09 FOLLOW-UP EXAMINATION FOLLOWING SURGERY: Primary | ICD-10-CM

## 2020-10-02 PROCEDURE — 99024 POSTOP FOLLOW-UP VISIT: CPT | Performed by: SURGERY

## 2020-10-02 NOTE — PROGRESS NOTES
"Surgery Postoperative Note    S: Aren is here for one month follow up after his bilateral laparoscopic inguinal hernia repairs and umbilical hernia repair. He reports he continue to have intermittent pain in his groin, worse with intercourse. But that it is similar to the pain he was having prior to the surgery with intercourse. He thinks he may have \"over did\" it with activity the first few weeks. He denies any new bulge. He reports he was taking 15mg of percocet and oxycodone initially but then quit due to constipation and felt like he had some withdrawal symptoms.     Abdomen: incisions all well healed, no evidence of hernia recurrence, tender with exam.        A/P  Aren Emanuel is recovering from a bilateral laparoscopic inguinal hernias and umbilical hernia repair one month ago. I advised him to slowly return to regular activity. I expect he will make a complete recovery without issues. He will follow up prn going forward.     Thank you for the opportunity to help in his care.    Alisha Montero MD  [facility]  [phone]    Please route or send letter to:  Primary Care Provider (PCP) and Referring Provider    "

## 2020-11-23 ENCOUNTER — TELEPHONE (OUTPATIENT)
Dept: SURGERY | Facility: CLINIC | Age: 20
End: 2020-11-23

## 2020-11-23 NOTE — TELEPHONE ENCOUNTER
Procedure:  Lap bilateral inguinal hernia and open umbilical hernia repair    Date:  08/26/2020    Surgeon:  Kylah      Patient reports that he surprised his girlfriend over a week ago and picked her up from behind.  He reports upon doing this he felt pain in both inguinal areas.    He has been taking it easy over the past week, but symptoms are not subsided. He is concerned that he did something to the mesh.    Informed him at this point post operative, not real likely that he affected the mesh, more likely that he injured muscles in the area as they are still regaining strength.    Patient reports that is what he thought, too, initially, but it is not getting better and it feels similar to prior to having surgical repair.    He has been utilizing heat, ibuprofen, and tylenol.    Will have him see Dr. Montero tomorrow.  Will call him if she would prefer it to be virtual vs face to face.    He is in agreement.    Petra Garrett, RN-BSN

## 2020-12-06 ENCOUNTER — HEALTH MAINTENANCE LETTER (OUTPATIENT)
Age: 20
End: 2020-12-06

## 2020-12-15 ENCOUNTER — OFFICE VISIT (OUTPATIENT)
Dept: SURGERY | Facility: CLINIC | Age: 20
End: 2020-12-15
Payer: COMMERCIAL

## 2020-12-15 DIAGNOSIS — Z09 SURGERY FOLLOW-UP EXAMINATION: Primary | ICD-10-CM

## 2020-12-15 PROCEDURE — 99024 POSTOP FOLLOW-UP VISIT: CPT | Performed by: SURGERY

## 2020-12-15 NOTE — PATIENT INSTRUCTIONS
[facility] is reviewing your records and will reach out to you directly to schedule a consultation.

## 2020-12-15 NOTE — PROGRESS NOTES
Surgery Postoperative Note    S: Aren reports he had lifted his girlfriend a number of weeks ago and had pain in his groin. This pain has resolved. He has had intermittent left lower quadrant pain though. No nausea or emesis. He recently moved and has a number of stressors in his life right now.     Abdomen: incisions well healed. No evidence of recurrent inguinal or umbilical hernia. Mildly tender to exam in left lower quadrant with palpation, no hernia at this site      A/P  Aren Emanuel is recovering from a bilateral laparoscopic inguinal hernia repair with mesh and umbilical hernia repair in August 2020. He has no evidence of recurrent hernia on exam and symptoms are improving in the groin with rest and stretching. He does have left lower quadrant pain on his abdomen well above the inguinal ligament. He is interested in meeting with GI to discuss possible alternative etiologies of LLQ pain. No h/o diverticulitis or family history of colon cancer. Denies constipation now (was very constipated following surgery). No blood in stools. Meeting with GI is reasonable and a referral was placed. He will follow up with me PRN going forward.     Thank you for the opportunity to help in his care.    Alisha Montero MD  [facility]  [phone]    Please route or send letter to:  Primary Care Provider (PCP) and Referring Provider

## 2021-04-11 ENCOUNTER — NURSE TRIAGE (OUTPATIENT)
Dept: NURSING | Facility: CLINIC | Age: 21
End: 2021-04-11

## 2021-04-11 ENCOUNTER — HOSPITAL ENCOUNTER (EMERGENCY)
Facility: CLINIC | Age: 21
Discharge: HOME | End: 2021-04-12
Attending: EMERGENCY MEDICINE | Admitting: EMERGENCY MEDICINE
Payer: COMMERCIAL

## 2021-04-11 DIAGNOSIS — R10.31 RIGHT INGUINAL PAIN: ICD-10-CM

## 2021-04-11 PROCEDURE — 87491 CHLMYD TRACH DNA AMP PROBE: CPT | Performed by: EMERGENCY MEDICINE

## 2021-04-11 PROCEDURE — 80048 BASIC METABOLIC PNL TOTAL CA: CPT | Performed by: EMERGENCY MEDICINE

## 2021-04-11 PROCEDURE — 250N000013 HC RX MED GY IP 250 OP 250 PS 637: Performed by: EMERGENCY MEDICINE

## 2021-04-11 PROCEDURE — 81001 URINALYSIS AUTO W/SCOPE: CPT | Performed by: EMERGENCY MEDICINE

## 2021-04-11 PROCEDURE — 87591 N.GONORRHOEAE DNA AMP PROB: CPT | Performed by: EMERGENCY MEDICINE

## 2021-04-11 PROCEDURE — 85025 COMPLETE CBC W/AUTO DIFF WBC: CPT | Performed by: EMERGENCY MEDICINE

## 2021-04-11 PROCEDURE — 99284 EMERGENCY DEPT VISIT MOD MDM: CPT | Mod: 25

## 2021-04-11 RX ORDER — ACETAMINOPHEN 500 MG
1000 TABLET ORAL ONCE
Status: COMPLETED | OUTPATIENT
Start: 2021-04-11 | End: 2021-04-11

## 2021-04-11 RX ORDER — IBUPROFEN 600 MG/1
600 TABLET, FILM COATED ORAL ONCE
Status: COMPLETED | OUTPATIENT
Start: 2021-04-11 | End: 2021-04-11

## 2021-04-11 RX ADMIN — IBUPROFEN 600 MG: 600 TABLET ORAL at 23:38

## 2021-04-11 RX ADMIN — ACETAMINOPHEN 1000 MG: 500 TABLET, FILM COATED ORAL at 23:38

## 2021-04-11 ASSESSMENT — ENCOUNTER SYMPTOMS
HEMATURIA: 0
DYSURIA: 0

## 2021-04-12 ENCOUNTER — APPOINTMENT (OUTPATIENT)
Dept: ULTRASOUND IMAGING | Facility: CLINIC | Age: 21
End: 2021-04-12
Attending: EMERGENCY MEDICINE
Payer: COMMERCIAL

## 2021-04-12 VITALS
DIASTOLIC BLOOD PRESSURE: 61 MMHG | TEMPERATURE: 98 F | OXYGEN SATURATION: 98 % | SYSTOLIC BLOOD PRESSURE: 109 MMHG | HEART RATE: 100 BPM | RESPIRATION RATE: 18 BRPM

## 2021-04-12 LAB
ALBUMIN UR-MCNC: NEGATIVE MG/DL
ANION GAP SERPL CALCULATED.3IONS-SCNC: 4 MMOL/L (ref 3–14)
APPEARANCE UR: CLEAR
BASOPHILS # BLD AUTO: 0.1 10E9/L (ref 0–0.2)
BASOPHILS NFR BLD AUTO: 0.6 %
BILIRUB UR QL STRIP: NEGATIVE
BUN SERPL-MCNC: 14 MG/DL (ref 7–30)
C TRACH DNA SPEC QL NAA+PROBE: NEGATIVE
CALCIUM SERPL-MCNC: 8.8 MG/DL (ref 8.5–10.1)
CHLORIDE SERPL-SCNC: 107 MMOL/L (ref 94–109)
CO2 SERPL-SCNC: 27 MMOL/L (ref 20–32)
COLOR UR AUTO: NORMAL
CREAT SERPL-MCNC: 0.75 MG/DL (ref 0.66–1.25)
DIFFERENTIAL METHOD BLD: NORMAL
EOSINOPHIL # BLD AUTO: 0.2 10E9/L (ref 0–0.7)
EOSINOPHIL NFR BLD AUTO: 2.3 %
ERYTHROCYTE [DISTWIDTH] IN BLOOD BY AUTOMATED COUNT: 11.6 % (ref 10–15)
GFR SERPL CREATININE-BSD FRML MDRD: >90 ML/MIN/{1.73_M2}
GLUCOSE SERPL-MCNC: 73 MG/DL (ref 70–99)
GLUCOSE UR STRIP-MCNC: NEGATIVE MG/DL
HCT VFR BLD AUTO: 41.2 % (ref 40–53)
HGB BLD-MCNC: 13.8 G/DL (ref 13.3–17.7)
HGB UR QL STRIP: NEGATIVE
IMM GRANULOCYTES # BLD: 0 10E9/L (ref 0–0.4)
IMM GRANULOCYTES NFR BLD: 0.3 %
KETONES UR STRIP-MCNC: NEGATIVE MG/DL
LEUKOCYTE ESTERASE UR QL STRIP: NEGATIVE
LYMPHOCYTES # BLD AUTO: 2.8 10E9/L (ref 0.8–5.3)
LYMPHOCYTES NFR BLD AUTO: 26.6 %
MCH RBC QN AUTO: 29.4 PG (ref 26.5–33)
MCHC RBC AUTO-ENTMCNC: 33.5 G/DL (ref 31.5–36.5)
MCV RBC AUTO: 88 FL (ref 78–100)
MONOCYTES # BLD AUTO: 0.9 10E9/L (ref 0–1.3)
MONOCYTES NFR BLD AUTO: 8.4 %
N GONORRHOEA DNA SPEC QL NAA+PROBE: NEGATIVE
NEUTROPHILS # BLD AUTO: 6.5 10E9/L (ref 1.6–8.3)
NEUTROPHILS NFR BLD AUTO: 61.8 %
NITRATE UR QL: NEGATIVE
NRBC # BLD AUTO: 0 10*3/UL
NRBC BLD AUTO-RTO: 0 /100
PH UR STRIP: 7 PH (ref 5–7)
PLATELET # BLD AUTO: 222 10E9/L (ref 150–450)
POTASSIUM SERPL-SCNC: 3.6 MMOL/L (ref 3.4–5.3)
RBC # BLD AUTO: 4.7 10E12/L (ref 4.4–5.9)
RBC #/AREA URNS AUTO: <1 /HPF (ref 0–2)
SODIUM SERPL-SCNC: 138 MMOL/L (ref 133–144)
SOURCE: NORMAL
SP GR UR STRIP: 1 (ref 1–1.03)
SPECIMEN SOURCE: NORMAL
SPECIMEN SOURCE: NORMAL
SQUAMOUS #/AREA URNS AUTO: 0 /HPF (ref 0–1)
UROBILINOGEN UR STRIP-MCNC: 0 MG/DL (ref 0–2)
WBC # BLD AUTO: 10.5 10E9/L (ref 4–11)
WBC #/AREA URNS AUTO: 0 /HPF (ref 0–5)

## 2021-04-12 PROCEDURE — 76870 US EXAM SCROTUM: CPT

## 2021-04-12 ASSESSMENT — ENCOUNTER SYMPTOMS
SHORTNESS OF BREATH: 0
FEVER: 0
ABDOMINAL PAIN: 0
COUGH: 0
NAUSEA: 0
VOMITING: 0

## 2021-04-12 NOTE — TELEPHONE ENCOUNTER
Patient calling reporting having pain on his testicles. Rates pain at 8/10. Per guideline, advised patient to be seen at the emergency department. Caller verbalized understanding. Denies further questions.      Adan Hung RN  [facility]     COVID 19 Nurse Triage Plan/Patient Instructions    Please be aware that novel coronavirus (COVID-19) may be circulating in the community. If you develop symptoms such as fever, cough, or SOB or if you have concerns about the presence of another infection including coronavirus (COVID-19), please contact your health care provider or visit [email or web address].     Disposition/Instructions    ED Visit recommended. Follow protocol based instructions.     Bring Your Own Device:  Please also bring your smart device(s) (smart phones, tablets, laptops) and their charging cables for your personal use and to communicate with your care team during your visit.    Thank you for taking steps to prevent the spread of this virus.  o Limit your contact with others.  o Wear a simple mask to cover your cough.  o Wash your hands well and often.    Resources    [facility]: About COVID-19: [email or web address]    CDC: What to Do If You're Sick: www.cdc.gov/coronavirus/2019-ncov/about/steps-when-sick.html    CDC: Ending Home Isolation: www.cdc.gov/coronavirus/2019-ncov/hcp/disposition-in-home-patients.html     CDC: Caring for Someone: www.cdc.gov/coronavirus/2019-ncov/if-you-are-sick/care-for-someone.html     [facility]: Interim Guidance for Hospital Discharge to Home: www.health[facility]/diseases/coronavirus/hcp/hospdischarge.pdf    [facility] clinical trials (COVID-19 research studies): [email or web address].edu/um-clinical-trials     Below are the COVID-19 hotlines at the [place] [facility] ([facility]). Interpreters are available.   o For health questions: Call 970-663-8881 or 1-854.456.9110 (7 a.m. to 7 p.m.)  o For questions about  schools and childcare: Call 335-694-2384 or 1-695.385.9621 (7 a.m. to 7 p.m.)                       Reason for Disposition    SEVERE pain (e.g., excruciating)    Protocols used: SCROTAL PAIN-A-AH

## 2021-04-12 NOTE — ED TRIAGE NOTES
Pain in his testicles starting in the last few days, worse on the right. He had hernia surgery last August.

## 2021-04-12 NOTE — ED PROVIDER NOTES
History   Chief Complaint:  Groin Pain       HPI   Aren Emanuel is a 20 year old male with history of chronic pain, prostatitis, and multiple hernias who presents with testicular pain. Per the patient, for the past 3 days he has had right-sided testicular pain which worsens when he is bending over. The pain is similar to his chronic pain but worse. He called the nurse line who recommended he present to the emergency department. He denies dysuria and hematuria but does state urinating worsens his pain. He has not seen a urologist since August 2020 who told him his pain originated from anxiety. He is sexually active, denies a history of STDs, and states he has not been checked for STDs recently.    Review of Systems   Constitutional: Negative for fever.   Respiratory: Negative for cough and shortness of breath.    Cardiovascular: Negative for chest pain.   Gastrointestinal: Negative for abdominal pain, nausea and vomiting.   Genitourinary: Positive for testicular pain. Negative for dysuria and hematuria.   All other systems reviewed and are negative.        Allergies:  The patient has no known allergies.     Medications:  The patient states he is not currently taking any prescribed medications.    Past Medical History:    Amphetamine abuse in remission   Anxiety  ADHD  Bipolar I disorder  Cannabis use  Depression  Prostate infection  Bilateral inguinal hernia  Umbilical hernia    Past Surgical History:    Continent diversion, w/ bowel anastomosis  Herniorrhaphy umbilical  Herniorrhaphy inguinal, bilateral  Tonsillectomy    Family History:    Mother: Depression, bipolar disorder  Father: Hyperlipidemia  Sister: Seizure disorder  Brother: Depression, PTSD  Sister: Depression, anxiety disorder    Social History:  The patient was unaccompanied to the ER  Tobacco Use: Positive  Drug Use: Occasional marijuana use  Alcohol Use: Occasional  PCP: None    Physical Exam     Patient Vitals for the past 24 hrs:   BP Temp  Pulse Resp SpO2   04/12/21 0125 109/61 -- -- -- 98 %   04/11/21 2242 -- 98  F (36.7  C) 100 18 99 %       Physical Exam  General: Appears well-developed and well-nourished.   Head: No signs of trauma.   CV: Normal rate and regular rhythm.    Resp: Effort normal and breath sounds normal. No respiratory distress.   GI: Soft. There is no tenderness.  No rebound or guarding.  Normal bowel sounds.  No CVA tenderness.  Rectal:  Normal feeling prostate without tenderness.  :  Right ependymal area tenderness.  No pain to testicles.  No swelling or mass or hernia noted.  MSK: Normal range of motion.   Neuro: The patient is alert and oriented. Speech normal.  Skin: Skin is warm and dry. No rash noted.   Psych: normal mood and affect. behavior is normal.       Emergency Department Course     Imaging:    US Testicular & Scrotum w Doppler Ltd  1.  Normal testicles.  2.  Small right hydrocele.  Reading per radiology.    The above imaging workup was performed.     Laboratory:  CBC: WBC 10.5, HGB 13.8,   BMP: WNL (Creatinine 0.75)  UA with micro: WNL  Neisseria gonorrhoea PCR: In Process  Chlamydia trachomatis PCR: In Process    Emergency Department Course:    Reviewed:  I reviewed nursing notes, vitals and past medical history    Assessments:  2317 I obtained history and examined the patient as noted above.   0132 I rechecked the patient and explained findings.     Interventions:  2338: Tylenol, 1000 mg, Oral  2338: Ibuprofen, 600 mg, Oral    Disposition:  The patient was discharged to home.       Impression & Plan     Medical Decision Making:  Aren QUEZADA Al Adriana is a 20-year-old gentleman who presents due to groin pain.  He reports over the last few days he has had pain to the right testicle area.  Apparently he has had pain in this area for many months has been seen by urology both in [place] and here.  Per the patient and chart review the exact cause of the pain is not fully clear.  He has had a cystoscopy and other  specialty testing that has been reassuring.  He recently had hernia repairs done a few months ago but continued to have the pain both before and after this.  Patient's exam was overall benign.  There is no signs of hernia at this time.  Blood work and urine were obtained that were nonconcerning.  Ultrasound did show a small hydrocele but no signs of torsion, epididymitis, or other concerns.  Given the chronicity of the symptoms I did stress the importance of following up with urology.  Surgeon had also recommended follow-up with GI which I encouraged him to do and also stressed the importance of seeing a primary care doctor and put in a referral for primary care given the overall chronicity of symptoms.  Also he is recommended to continue with Tylenol and ibuprofen which he has not been taking.    Diagnosis:    ICD-10-CM    1. Right inguinal pain  R10.31 Primary Care Referral       Scribe Disclosure:  I, [place] Ramseynzen, am serving as a scribe at 11:17 PM on 4/11/2021 to document services personally performed by Michael Gómez MD based on my observations and the provider's statements to me.        Michael Gómez MD  04/12/21 9729

## 2021-04-12 NOTE — RESULT ENCOUNTER NOTE
Final result for both N. Gonorrhoeae PCR and Chlamydia Trachomatis PCR are NEGATIVE.  No treatment or change in treatment per [facility] ED Lab Result N. Gonorrhea AND/OR Chlamydia T. protocol.

## 2021-07-21 ENCOUNTER — TELEPHONE (OUTPATIENT)
Dept: SURGERY | Facility: PHYSICIAN GROUP | Age: 21
End: 2021-07-21

## 2021-07-21 DIAGNOSIS — R10.31 RIGHT GROIN PAIN: Primary | ICD-10-CM

## 2021-07-21 NOTE — TELEPHONE ENCOUNTER
Aren called our MD answering service due to intermittent pain in his right groin. He reports he is worried he may have a recurrent hernia. He has been exercising and lifting weights again and has episodes of significant pain in the right groin. He was scheduled for a clinic visit with me a couple weeks ago and cancelled as he has been very busy at work and reports it is difficult to come in as he works until 7pm most days. We discussed it is possible to have a hernia recurrence and a CT and physical exam is recommended to evaluate. I will order a CT for him and would ideally like to review with him at a clinic visit to assess for recurrence with clinical exam as well. He did have issues with chronic pelvic pain prior to his hernia repair and this may be ongoing but we should rule out hernia recurrence.     Alisha Montero MD  [facility]  [phone]

## 2021-07-24 ENCOUNTER — HOSPITAL ENCOUNTER (EMERGENCY)
Facility: CLINIC | Age: 21
Discharge: HOME | End: 2021-07-24
Payer: COMMERCIAL

## 2021-07-24 ENCOUNTER — HOSPITAL ENCOUNTER (EMERGENCY)
Facility: CLINIC | Age: 21
Discharge: HOME | End: 2021-07-24
Attending: INTERNAL MEDICINE | Admitting: INTERNAL MEDICINE
Payer: COMMERCIAL

## 2021-07-24 VITALS
OXYGEN SATURATION: 100 % | BODY MASS INDEX: 25.71 KG/M2 | DIASTOLIC BLOOD PRESSURE: 98 MMHG | WEIGHT: 160 LBS | RESPIRATION RATE: 18 BRPM | SYSTOLIC BLOOD PRESSURE: 131 MMHG | TEMPERATURE: 98.6 F | HEIGHT: 66 IN | HEART RATE: 61 BPM

## 2021-07-24 DIAGNOSIS — G47.00 INSOMNIA, UNSPECIFIED TYPE: ICD-10-CM

## 2021-07-24 DIAGNOSIS — R00.2 PALPITATIONS: ICD-10-CM

## 2021-07-24 PROCEDURE — 99283 EMERGENCY DEPT VISIT LOW MDM: CPT

## 2021-07-24 PROCEDURE — 93005 ELECTROCARDIOGRAM TRACING: CPT

## 2021-07-24 PROCEDURE — 99283 EMERGENCY DEPT VISIT LOW MDM: CPT | Performed by: INTERNAL MEDICINE

## 2021-07-24 RX ORDER — HYDROXYZINE HYDROCHLORIDE 25 MG/1
50-100 TABLET, FILM COATED ORAL 3 TIMES DAILY PRN
Qty: 20 TABLET | Refills: 0 | Status: SHIPPED | OUTPATIENT
Start: 2021-07-24 | End: 2021-12-17

## 2021-07-24 ASSESSMENT — ENCOUNTER SYMPTOMS
CONFUSION: 0
ARTHRALGIAS: 0
DIFFICULTY URINATING: 0
ABDOMINAL PAIN: 0
COLOR CHANGE: 0
EYE REDNESS: 0
FEVER: 0
SHORTNESS OF BREATH: 0
HEADACHES: 0
NERVOUS/ANXIOUS: 1
SLEEP DISTURBANCE: 1
NECK STIFFNESS: 0

## 2021-07-24 ASSESSMENT — MIFFLIN-ST. JEOR: SCORE: 1668.51

## 2021-07-24 NOTE — ED TRIAGE NOTES
Pt reports taking adderal starting Wednesday since his father passed away. Pt reports he has been unable to sleep since then. He also stated he has had dry mouth, chest pain, palpitations, difficulty urinating, and he feels like his blood vessels are constricted.   Minnie Agrawal RN on 7/24/2021 at 8:13 AM

## 2021-07-24 NOTE — ED NOTES
Patient given discharge papers. Patient was told he is getting hydroxyzine. Patient demanded something else. Provider was not going to prescribe anything else for patient. Patient crumbled up papers and threw them on the ground.

## 2021-07-24 NOTE — ED PROVIDER NOTES
"    [place] EMERGENCY DEPARTMENT ([facility])  7/24/21  History     Chief Complaint   Patient presents with     Palpitations     Medication Reaction     The history is provided by the patient and medical records.     Aren Emanuel is a 21 year old male with a past medical history significant for ADHD, bipolar 1 disorder, and substance abuse (amphetamines and marijuana) who presents to the Emergency Department for evaluation of insomnia.  Patient reports he has a longstanding history of insomnia.  He states he has been prescribed Seroquel, trazodone, and Ambien which have all been unsuccessful in treating his chronic insomnia.  Patient states that he had to work on Wednesday, and took an Adderall in the morning to help.  He states that he was pleased with the effects, and took another 1 in the afternoon.  Patient reports he then stayed up all night, and wanted to take another Adderall on Thursday morning.  He states he was unable to fall asleep Thursday night following this, and required an additional Adderall on Friday morning.  He states Friday morning was his last amphetamine use.  He states he tried to use Seroquel on Wednesday night which was counterproductive as this only made him feel \"more paranoid \".  Patient states that he would just like to try something and sleep at home.    I have reviewed the Medications, Allergies, Past Medical and Surgical History, and Social History in the [facility] system.  PAST MEDICAL HISTORY:   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)      Anxiety      Attention deficit disorder with hyperactivity(314.01)      Bipolar I disorder, most recent episode (or current) unspecified      Cannabis abuse      Depression      Hernia, abdominal      Prostate infection 10/2019       PAST SURGICAL HISTORY:   Past Surgical History:   Procedure Laterality Date     C CONTINENT DIVERSION,W/BOWEL ANASTOMOSIS       CYSTOSCOPY       HERNIORRHAPHY UMBILICAL " N/A 8/26/2020    Procedure: OPEN UMBILICAL HERNIA REPAIR;  Surgeon: Alisha Montero MD;  Location: [facility] OR     LAPAROSCOPIC HERNIORRHAPHY INGUINAL BILATERAL Bilateral 8/26/2020    Procedure: LAPAROSCOPIC BILATERAL INGUINAL HERNIA REPAIR WITH MESH;  Surgeon: Alisha Montero MD;  Location: [facility] OR     TONSILLECTOMY       XR WRIST SURGERY JAMES RIGHT  2017       Past medical history, past surgical history, medications, and allergies were reviewed with the patient. Additional pertinent items: None    FAMILY HISTORY:   Family History   Problem Relation Age of Onset     Depression Mother      Bipolar Disorder Mother      Hyperlipidemia Father      Seizure Disorder Sister      Depression Brother         estranged     Other - See Comments Brother         PTDS     Depression Sister      Anxiety Disorder Sister        SOCIAL HISTORY:   Social History     Tobacco Use     Smoking status: Former Smoker     Types: Cigarettes     Smokeless tobacco: Current User     Tobacco comment: e cig   Substance Use Topics     Alcohol use: Yes     Comment: occaisional      Social history was reviewed with the patient. Additional pertinent items: None      Discharge Medication List as of 7/24/2021  9:06 AM      START taking these medications    Details   hydrOXYzine (ATARAX) 25 MG tablet Take 2-4 tablets ( mg) by mouth 3 times daily as needed for anxiety or other (insomnia), Disp-20 tablet, R-0, Local Print         CONTINUE these medications which have NOT CHANGED    Details   acetaminophen (TYLENOL) 500 MG tablet Take 1-2 tablets (500-1,000 mg) by mouth every 6 hours as needed for mild pain, Disp-1 Bottle,R-0, E-Prescribe      cyclobenzaprine (FLEXERIL) 10 MG tablet Take 0.5-1 tablets (5-10 mg) by mouth every 8 hours as needed for muscle spasms, Disp-15 tablet,R-0, E-Prescribe      QUEtiapine (SEROQUEL) 100 MG tablet 200 mg, Historical      triamcinolone (KENALOG) 0.1 % cream Apply sparingly to affected area two times daily for 7  "days.Disp-15 g, L-1A-Dnghefyuf      venlafaxine (EFFEXOR-XR) 75 MG 24 hr capsule TAKE 1 CAPSULE BY MOUTH IN THE MORNING, Historical      VITAMIN D, CHOLECALCIFEROL, PO Take 2,000 Units by mouth daily, Historical              No Known Allergies     Review of Systems   Constitutional: Negative for fever.   HENT: Negative for congestion.    Eyes: Negative for redness.   Respiratory: Negative for shortness of breath.    Cardiovascular: Negative for chest pain.   Gastrointestinal: Negative for abdominal pain.   Genitourinary: Negative for difficulty urinating.   Musculoskeletal: Negative for arthralgias and neck stiffness.   Skin: Negative for color change.   Neurological: Negative for headaches.   Psychiatric/Behavioral: Positive for sleep disturbance. Negative for confusion. The patient is nervous/anxious.    All other systems reviewed and are negative.        Physical Exam   BP: (!) 131/98  Pulse: 61  Temp: 98.6  F (37  C)  Resp: 18  Height: 167.6 cm (5' 6\")  Weight: 72.6 kg (160 lb)  SpO2: 100 %      Physical Exam  Vitals and nursing note reviewed.   Constitutional:       General: He is not in acute distress.     Appearance: He is not diaphoretic.   HENT:      Head: Atraumatic.   Eyes:      General: No scleral icterus.     Pupils: Pupils are equal, round, and reactive to light.   Cardiovascular:      Heart sounds: Normal heart sounds.   Pulmonary:      Effort: No respiratory distress.      Breath sounds: Normal breath sounds.   Abdominal:      General: Bowel sounds are normal.      Palpations: Abdomen is soft.      Tenderness: There is no abdominal tenderness.   Musculoskeletal:         General: No tenderness.   Skin:     General: Skin is warm.      Findings: No rash.   Neurological:      General: No focal deficit present.      Cranial Nerves: No cranial nerve deficit.   Psychiatric:         Attention and Perception: Attention normal.         Mood and Affect: Mood is anxious. Affect is labile, blunt and angry.        "  Speech: Speech is rapid and pressured and tangential.         Behavior: Behavior is agitated and aggressive. Behavior is not slowed, withdrawn or combative.         Thought Content: Thought content is not paranoid or delusional. Thought content does not include homicidal or suicidal ideation.         ED Course     At 8:59 AM the patient was seen and examined by Marie Reagan MD in Room ED05.        Procedures           Results for orders placed or performed during the hospital encounter of 07/24/21 (from the past 24 hour(s))   EKG 12 lead   Result Value Ref Range    Systolic Blood Pressure  mmHg    Diastolic Blood Pressure  mmHg    Ventricular Rate 63 BPM    Atrial Rate 63 BPM    AL Interval 118 ms    QRS Duration 92 ms     ms    QTc 419 ms    P Axis 63 degrees    R AXIS 40 degrees    T Axis 44 degrees    Interpretation ECG Sinus rhythm with sinus arrhythmia  Normal ECG        Medications - No data to display       Assessments & Plan (with Medical Decision Making)    Palpitations but no tachycardia, EKG without any acute findings, probably related to his anxiety possibly ilicit drug use, and also complains of insominia-again likely related to anxiety and or substance use, will give him atarax 50 -100 prn, no controlled substance, follow up with his PMD and MH providers.         I have reviewed the nursing notes.    I have reviewed the findings, diagnosis, plan and need for follow up with the patient.    Discharge Medication List as of 7/24/2021  9:06 AM      START taking these medications    Details   hydrOXYzine (ATARAX) 25 MG tablet Take 2-4 tablets ( mg) by mouth 3 times daily as needed for anxiety or other (insomnia), Disp-20 tablet, R-0, Local Print             Final diagnoses:   Palpitations   Insomnia, unspecified type       I, aZki Velasco am serving as a trained medical scribe to document services personally performed by Marie Reagan MD, based on the provider's statements to me.       I, Marie Reagan MD, was physically present and have reviewed and verified the accuracy of this note documented by Zaki Velasco.     Marie Reagan MD  7/24/2021   [facility] EMERGENCY DEPARTMENT     Marie Reagan MD  07/24/21 2487

## 2021-07-25 LAB
ATRIAL RATE - MUSE: 63 BPM
DIASTOLIC BLOOD PRESSURE - MUSE: NORMAL MMHG
INTERPRETATION ECG - MUSE: NORMAL
P AXIS - MUSE: 63 DEGREES
PR INTERVAL - MUSE: 118 MS
QRS DURATION - MUSE: 92 MS
QT - MUSE: 410 MS
QTC - MUSE: 419 MS
R AXIS - MUSE: 40 DEGREES
SYSTOLIC BLOOD PRESSURE - MUSE: NORMAL MMHG
T AXIS - MUSE: 44 DEGREES
VENTRICULAR RATE- MUSE: 63 BPM

## 2021-07-26 ENCOUNTER — HOSPITAL ENCOUNTER (OUTPATIENT)
Dept: CT IMAGING | Facility: CLINIC | Age: 21
Discharge: HOME | End: 2021-07-26
Attending: SURGERY | Admitting: SURGERY
Payer: COMMERCIAL

## 2021-07-26 DIAGNOSIS — R10.31 RIGHT GROIN PAIN: ICD-10-CM

## 2021-07-26 PROCEDURE — 74177 CT ABD & PELVIS W/CONTRAST: CPT

## 2021-07-26 PROCEDURE — 250N000009 HC RX 250: Performed by: SURGERY

## 2021-07-26 PROCEDURE — 250N000011 HC RX IP 250 OP 636: Performed by: SURGERY

## 2021-07-26 RX ORDER — IOPAMIDOL 755 MG/ML
79 INJECTION, SOLUTION INTRAVASCULAR ONCE
Status: COMPLETED | OUTPATIENT
Start: 2021-07-26 | End: 2021-07-26

## 2021-07-26 RX ADMIN — SODIUM CHLORIDE 63 ML: 9 INJECTION, SOLUTION INTRAVENOUS at 18:39

## 2021-07-26 RX ADMIN — IOPAMIDOL 79 ML: 755 INJECTION, SOLUTION INTRAVENOUS at 18:39

## 2021-07-27 ENCOUNTER — TELEPHONE (OUTPATIENT)
Dept: SURGERY | Facility: PHYSICIAN GROUP | Age: 21
End: 2021-07-27

## 2021-07-27 NOTE — TELEPHONE ENCOUNTER
I left a voicemail for Aren letting him know I have his CT results and that I would like to see him for a clinic visit to review and for clinical exam given ongoing groin pain.     Alisha Montero MD  [facility]  [phone]

## 2021-09-25 ENCOUNTER — HEALTH MAINTENANCE LETTER (OUTPATIENT)
Age: 21
End: 2021-09-25

## 2021-12-17 ENCOUNTER — OFFICE VISIT (OUTPATIENT)
Dept: FAMILY MEDICINE | Facility: CLINIC | Age: 21
End: 2021-12-17

## 2021-12-17 VITALS
TEMPERATURE: 97.9 F | RESPIRATION RATE: 22 BRPM | BODY MASS INDEX: 28.89 KG/M2 | SYSTOLIC BLOOD PRESSURE: 106 MMHG | WEIGHT: 179 LBS | HEART RATE: 66 BPM | DIASTOLIC BLOOD PRESSURE: 68 MMHG | OXYGEN SATURATION: 99 %

## 2021-12-17 DIAGNOSIS — L60.0 INGROWN NAIL: ICD-10-CM

## 2021-12-17 DIAGNOSIS — G89.29 CHRONIC BILATERAL LOW BACK PAIN, UNSPECIFIED WHETHER SCIATICA PRESENT: Primary | ICD-10-CM

## 2021-12-17 DIAGNOSIS — L60.3 NAIL FRAGILITY: ICD-10-CM

## 2021-12-17 DIAGNOSIS — M53.3 SI (SACROILIAC) JOINT DYSFUNCTION: ICD-10-CM

## 2021-12-17 DIAGNOSIS — M54.50 CHRONIC BILATERAL LOW BACK PAIN, UNSPECIFIED WHETHER SCIATICA PRESENT: Primary | ICD-10-CM

## 2021-12-17 PROCEDURE — 99214 OFFICE O/P EST MOD 30 MIN: CPT | Performed by: STUDENT IN AN ORGANIZED HEALTH CARE EDUCATION/TRAINING PROGRAM

## 2021-12-17 PROCEDURE — 72110 X-RAY EXAM L-2 SPINE 4/>VWS: CPT | Performed by: STUDENT IN AN ORGANIZED HEALTH CARE EDUCATION/TRAINING PROGRAM

## 2021-12-17 RX ORDER — DICLOFENAC SODIUM 75 MG/1
75 TABLET, DELAYED RELEASE ORAL 2 TIMES DAILY PRN
Qty: 60 TABLET | Refills: 1 | Status: SHIPPED | OUTPATIENT
Start: 2021-12-17 | End: 2022-02-22

## 2021-12-17 NOTE — PROGRESS NOTES
ASSESSMENT & PLAN    1. Chronic bilateral low back pain, unspecified whether sciatica present  2. SI (sacroiliac) joint dysfunction  Hx and exam without red flags, suspect possible SI joint dysfunction. XRs obtained and reviewed with patient, demonstrating mild degenerative changes as noted below. Recommend trial of NSAIDs and PT, with close follow-up if sx worsen or fail to resolve. Pt in agreement with this plan.   - XR Lumbar Spine G/E 4 Views  - Physical Therapy Referral; Future  - diclofenac (VOLTAREN) 75 MG EC tablet; Take 1 tablet (75 mg) by mouth 2 times daily as needed for moderate pain  Dispense: 60 tablet; Refill: 1    3. Ingrown nail  4. Nail fragility  Previously treated at Podiatrist but lost follow-up info, new referral placed  - Orthopedic  Referral; Future    Patient Instructions   Take diclofenac twice daily for 2 weeks, then as needed after that    Schedule physical therapy at [facility] [place] and see me in February if not improving     Follow-up with your podiatrist about your nails       >30 minutes spent on the date of the encounter doing chart review, history and exam, documentation and further activities per the note.    Jan Childress MD, [facility]      -----  Chief Complaint   Patient presents with     Back Pain     has had back pain for awhile now, sees chiropractor who said it may be herniated disc, wants imaging done     Toenail     feels he may have a fungal infection, had ingrown toenails fixed a little while ago but now feels like toenails are falling off, very brittle       SUBJECTIVE  Aren Emanuel is a/an 21 year old male who is seen for evaluation of low back pain.     The patient is seen by themselves.    Date of Onset: one year. Began after job lifting heavy objects but no acute injury  Location of Pain: bilateral low back  Worsened by: laying flat on back  Better with: stretches, chiropractic care for past 9 months with transient  improvement  No other treatments tried.  Associated symptoms: Was having some radicular pain down bilateral posterior thighs but not recently. No focal peripheral weakness or sensory changes.    Orthopedic/Surgical history: No spine hx  Social History/Occupation: works in warehouse    No family history pertinent to patient's problem today.      OBJECTIVE:  /68 (BP Location: Right arm, Patient Position: Sitting, Cuff Size: Adult Large)   Pulse 66   Temp 97.9  F (36.6  C) (Temporal)   Resp 22   Wt 81.2 kg (179 lb)   SpO2 99%   BMI 28.89 kg/m     General: healthy, alert and in no distress  HEENT: no scleral icterus or conjunctival erythema  Skin: no visible suspicious lesions or rashes.   CV: no pedal edema  Resp: normal respiratory effort without conversational dyspnea   Psych: normal mood and affect  Gait: normal, with appropriate coordination and balance     MSK:   Full lumbar ROM  Mild diffuse bilat lumbar parasp and SI joint TTP  +thigh thrust bilat and +sacral thrust, Neg SI compression and distraction  5/5 str BLE  SILT BLE  2+ DP and PT pulses      RADIOLOGY:  EXAM: LUMBOSACRAL 3 VIEW AP LAT SPOT LOCATION: [facility] DATE/TIME: 12/17/2021 4:29 PM INDICATION: Chronic low back pain. COMPARISON: None. TECHNIQUE: CR Lumbar Spine. IMPRESSION: Mild disc height loss at L5-S1. Moderate disc height loss at L4-L5. No fracture is identified. Dextroconvex curvature. Subtle grade I retrolistheses of L4 on L5 and L5 on S1. Soft tissues are unremarkable. Performed by: KENNY Transcribed By: DEMETRA on: 12/20/2021 7:46 AM CST Finalized By: TROY BALES M.D. on: 12/20/2021 7:55 AM CST Dictated By: TROY BALES M.D. Signed by: [facility] Page 1 of 1

## 2021-12-17 NOTE — PATIENT INSTRUCTIONS
Take diclofenac twice daily for 2 weeks, then as needed after that    Schedule physical therapy [facility] and see me in February if not improving     Follow-up with your podiatrist about your nails

## 2021-12-17 NOTE — NURSING NOTE
Chief Complaint   Patient presents with     Back Pain     has had back pain for awhile now, sees chiropractor who said it may be herniated disc, wants imaging done     Toenail     feels he may have a fungal infection, had ingrown toenails fixed a little while ago but now feels like toenails are falling off, very brittle     Pre-visit Screening:  Immunizations:  not up to date - due for tetanus   Colonoscopy:  NA  Mammogram: NA  Asthma Action Test/Plan:  NA  PHQ9:  NA  GAD7:  NA  Questioned patient about current smoking habits Pt. quit smoking some time ago.  Ok to leave detailed message on voice mail for today's visit only Yes, phone # [phone]

## 2022-01-15 ENCOUNTER — HEALTH MAINTENANCE LETTER (OUTPATIENT)
Age: 22
End: 2022-01-15

## 2022-02-22 ENCOUNTER — OFFICE VISIT (OUTPATIENT)
Dept: FAMILY MEDICINE | Facility: CLINIC | Age: 22
End: 2022-02-22

## 2022-02-22 VITALS
HEIGHT: 66 IN | DIASTOLIC BLOOD PRESSURE: 62 MMHG | BODY MASS INDEX: 28.42 KG/M2 | SYSTOLIC BLOOD PRESSURE: 104 MMHG | HEART RATE: 74 BPM | TEMPERATURE: 97.6 F | OXYGEN SATURATION: 98 % | WEIGHT: 176.8 LBS

## 2022-02-22 DIAGNOSIS — R10.84 ABDOMINAL PAIN, GENERALIZED: Primary | ICD-10-CM

## 2022-02-22 PROBLEM — F39 MOOD DISORDER (H): Status: RESOLVED | Noted: 2017-08-28 | Resolved: 2022-02-22

## 2022-02-22 PROBLEM — N41.1 CHRONIC PROSTATITIS: Status: ACTIVE | Noted: 2020-01-14

## 2022-02-22 LAB
APPEARANCE UR: CLEAR
BACTERIA, UR: ABNORMAL
BILIRUB UR QL: ABNORMAL
CASTS/LPF: ABNORMAL
COLOR UR: YELLOW
EP/HPF: ABNORMAL
GLUCOSE URINE: ABNORMAL MG/DL
HGB UR QL: ABNORMAL
KETONES UR QL: ABNORMAL MG/DL
MISC.: ABNORMAL
NITRITE UR QL STRIP: ABNORMAL
PH UR STRIP: 7 PH (ref 5–7)
PROT UR QL: ABNORMAL MG/DL
RBC, UR MICRO: ABNORMAL (ref ?–2)
SP GR UR STRIP: 1.01 (ref 1–1.03)
UROBILINOGEN UR QL STRIP: 0.2 EU/DL (ref 0.2–1)
WBC #/AREA URNS HPF: ABNORMAL /[HPF]
WBC, UR MICRO: ABNORMAL (ref ?–2)

## 2022-02-22 PROCEDURE — 87086 URINE CULTURE/COLONY COUNT: CPT | Mod: 90 | Performed by: FAMILY MEDICINE

## 2022-02-22 PROCEDURE — 99213 OFFICE O/P EST LOW 20 MIN: CPT | Performed by: FAMILY MEDICINE

## 2022-02-22 PROCEDURE — 81001 URINALYSIS AUTO W/SCOPE: CPT | Performed by: FAMILY MEDICINE

## 2022-02-22 NOTE — PROGRESS NOTES
"  Assessment & Plan   Problem List Items Addressed This Visit     None      Visit Diagnoses     Abdominal pain, generalized    -  Primary    Relevant Orders    URINE CULTURE AEROBIC BACTERIAL ([facility])    URINALYSIS, ROUTINE ([facility]) (Completed)    Adult Gastro Ref - Consult Only         1. Abdominal pain, generalized  He has had labs done, ct done no acute or new symptoms. I offered labs today but he said this is the same as his previous symptoms and he doesn't think any changes. Referral done to GI.  - URINE CULTURE AEROBIC BACTERIAL ([facility])  - URINALYSIS, ROUTINE ([facility])  - Adult Gastro Ref - Consult Only; Future     BMI:   Estimated body mass index is 28.54 kg/m  as calculated from the following:    Height as of this encounter: 1.676 m (5' 6\").    Weight as of this encounter: 80.2 kg (176 lb 12.8 oz).       FUTURE APPOINTMENTS:       - Follow-up visit with [facility]    No follow-ups on file.    Katie Catalan MD  [facility]    Isabel Younger is a 21 year old who presents for the following health issues     HPI     ED/UC Followup:    Facility:  [place] [facility]  Date of visit: 02/21/22  Reason for visit: lower abdominal pain  Current Status: pain is ongoing      Here for ER flup 2x yesterday.    Had had hernia surgery x3 about 2 years ago.   Has had pain in one area, comes and goes usually comes from strenuous activity. Doesn't matter what he does. If no activity, it still comes and goes. Has had multiple ct scans done for this.  Wondering if this an infection. Wondering if he should see GI.  Has been bad over the past month and doesn't know why.   normal bms. No blood or black stools. Has the pain daily. Is a tightness and numbness sensation, if he moves around, it's more painful. 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 Doesn't have to do lifting, but has to make a lot of awkward movements. Not sure if there is a mass.  Has seen the surgeon after the surgery. Last visit to see " surgeon: was told to see GI, he doesn't really want to see GI because he doesn't want to have an endoscopy.   The surgeon has done a CT scan. Was told there is nothing wrong with the mesh.   Yesterday went in to an urgent care because the pain was getting bad again.  The pain has been getting better and worse. Not based on eating. Vegetarian diet working out regularly.  Last ct scan was yesterday. Negative.  Has seen urology and they told him that his prostate and cystoscopy were normal.  The pain is to the left of umbilicus and back to the spine. Girlfriend told him to check the urine.          Review of Systems   Constitutional, HEENT, cardiovascular, pulmonary, gi and gu systems are negative, except as otherwise noted.      Objective    There were no vitals taken for this visit.  There is no height or weight on file to calculate BMI.  Physical Exam   GENERAL: healthy, alert and no distress  RESP: lungs clear to auscultation - no rales, rhonchi or wheezes  CV: regular rate and rhythm, normal S1 S2, no S3 or S4, no murmur, click or rub, no peripheral edema and peripheral pulses strong  ABDOMEN: soft, nontender, no hepatosplenomegaly, no masses and bowel sounds normal, has some tenderness to left of umbilicus  MS: no gross musculoskeletal defects noted, no edema  NEURO: Normal strength and tone, mentation intact and speech normal  PSYCH: mentation appears normal, affect normal/bright

## 2022-02-22 NOTE — NURSING NOTE
Chief Complaint   Patient presents with     ER F/U     was in ED yesetrday for abdominal pain, pain is near belly button, pain has been going on for a few months on and off, worse after activity, thinks it may be nerve related      Pre-visit Screening:  Immunizations:  not up to date - declines td today  Colonoscopy:  NA  Mammogram: NA  Asthma Action Test/Plan:  NA  PHQ9:  NA  GAD7:  NA  Questioned patient about current smoking habits Pt. has never smoked.  Ok to leave detailed message on voice mail for today's visit only Yes, phone # [phone]

## 2022-02-24 LAB — URINE VOIDED CULTURE: NORMAL

## 2022-03-30 ENCOUNTER — OFFICE VISIT (OUTPATIENT)
Dept: FAMILY MEDICINE | Facility: CLINIC | Age: 22
End: 2022-03-30

## 2022-03-30 VITALS
BODY MASS INDEX: 28.28 KG/M2 | HEART RATE: 78 BPM | WEIGHT: 176 LBS | SYSTOLIC BLOOD PRESSURE: 106 MMHG | HEIGHT: 66 IN | DIASTOLIC BLOOD PRESSURE: 70 MMHG | OXYGEN SATURATION: 97 % | TEMPERATURE: 100.5 F

## 2022-03-30 DIAGNOSIS — R09.81 NASAL CONGESTION: Primary | ICD-10-CM

## 2022-03-30 DIAGNOSIS — R09.89 CHEST CONGESTION: ICD-10-CM

## 2022-03-30 DIAGNOSIS — J02.9 SORE THROAT: ICD-10-CM

## 2022-03-30 LAB
% GRANULOCYTES: 60 %
HCT VFR BLD AUTO: 40.1 % (ref 40–53)
HEMOGLOBIN: 13.8 G/DL (ref 13.3–17.7)
LYMPHOCYTES NFR BLD AUTO: 33.6 %
MCH RBC QN AUTO: 31 PG (ref 26–33)
MCHC RBC AUTO-ENTMCNC: 34.4 G/DL (ref 31–36)
MCV RBC AUTO: 90.2 FL (ref 78–100)
MONOCYTES NFR BLD AUTO: 6.4 %
MONONUCLEOSIS SCREEN: NORMAL
PLATELET COUNT - QUEST: 199 10^9/L (ref 150–375)
RBC # BLD AUTO: 4.45 10*12/L (ref 4.4–5.9)
STREP A: NEGATIVE
WBC # BLD AUTO: 8.5 10*9/L (ref 4–11)

## 2022-03-30 PROCEDURE — 85025 COMPLETE CBC W/AUTO DIFF WBC: CPT | Performed by: FAMILY MEDICINE

## 2022-03-30 PROCEDURE — 87651 STREP A DNA AMP PROBE: CPT | Performed by: FAMILY MEDICINE

## 2022-03-30 PROCEDURE — 36415 COLL VENOUS BLD VENIPUNCTURE: CPT | Performed by: FAMILY MEDICINE

## 2022-03-30 PROCEDURE — 86318 IA INFECTIOUS AGENT ANTIBODY: CPT | Performed by: FAMILY MEDICINE

## 2022-03-30 PROCEDURE — 99214 OFFICE O/P EST MOD 30 MIN: CPT | Performed by: FAMILY MEDICINE

## 2022-03-30 RX ORDER — AZITHROMYCIN 250 MG/1
TABLET, FILM COATED ORAL
Qty: 6 TABLET | Refills: 0 | Status: SHIPPED | OUTPATIENT
Start: 2022-03-30 | End: 2022-04-04

## 2022-03-30 NOTE — PATIENT INSTRUCTIONS
"Assessment & Plan   Problem List Items Addressed This Visit     None      Visit Diagnoses     Nasal congestion    -  Primary    Relevant Orders    MONO SCREEN ([facility]) (Completed)    HEMOGRAM PLATELET DIFF ([facility]) (Completed)    VENOUS COLLECTION (Completed)    Sore throat        Relevant Orders    Strep A ([facility]) (Completed)    Chest congestion        Relevant Medications    azithromycin (ZITHROMAX) 250 MG tablet         1. Nasal congestion    - MONO SCREEN ([facility])  - HEMOGRAM PLATELET DIFF ([facility])  - VENOUS COLLECTION    2. Sore throat  Negative tests.  - Strep A ([facility])    3. Chest congestion  Continued congestion. Treat with zithromax x 5 days, followup as needed.  - azithromycin (ZITHROMAX) 250 MG tablet; Take 2 tablets (500 mg) by mouth daily for 1 day, THEN 1 tablet (250 mg) daily for 4 days.  Dispense: 6 tablet; Refill: 0           BMI:   Estimated body mass index is 28.41 kg/m  as calculated from the following:    Height as of this encounter: 1.676 m (5' 6\").    Weight as of this encounter: 79.8 kg (176 lb).         FUTURE APPOINTMENTS:       - Follow-up visit as needed.    No follow-ups on file.    Katie Catalan MD  [facility]    "

## 2022-03-30 NOTE — PROGRESS NOTES
"Assessment & Plan   Problem List Items Addressed This Visit     None      Visit Diagnoses     Nasal congestion    -  Primary    Relevant Orders    MONO SCREEN ([facility]) (Completed)    HEMOGRAM PLATELET DIFF ([facility]) (Completed)    VENOUS COLLECTION (Completed)    Sore throat        Relevant Orders    Strep A ([facility]) (Completed)    Chest congestion        Relevant Medications    azithromycin (ZITHROMAX) 250 MG tablet         1. Nasal congestion    - MONO SCREEN ([facility])  - HEMOGRAM PLATELET DIFF ([facility])  - VENOUS COLLECTION    2. Sore throat  Negative tests.  - Strep A ([facility])    3. Chest congestion  Continued congestion. Treat with zithromax x 5 days, followup as needed.  - azithromycin (ZITHROMAX) 250 MG tablet; Take 2 tablets (500 mg) by mouth daily for 1 day, THEN 1 tablet (250 mg) daily for 4 days.  Dispense: 6 tablet; Refill: 0           BMI:   Estimated body mass index is 28.41 kg/m  as calculated from the following:    Height as of this encounter: 1.676 m (5' 6\").    Weight as of this encounter: 79.8 kg (176 lb).         FUTURE APPOINTMENTS:       - Follow-up visit as needed.    No follow-ups on file.    Katie Catalan MD  [facility]    Subjective     Nursing Notes:   Yadira Escobar CMA  3/30/2022  2:44 PM  Signed  Chief Complaint   Patient presents with     URI     symptoms started 15 days ago with fever, sore throat, body aches, sore throat resolved, having congestion, sinus pressure in the morning , coughing yellow mucous, mulitple NEG covid tests     Pre-visit Screening:  Immunizations:  not up to date - tetanus declined today  Colonoscopy:  NA  Mammogram: NA  Asthma Action Test/Plan:  NA  PHQ9:  NA  GAD7:  NA  Questioned patient about current smoking habits Pt. Smokes on occasion  Ok to leave detailed message on voice mail for today's visit only Yes, phone # [phone]           Aren Emanuel is a 21 year old male who presents to clinic today for the following health issues   HPI     Sore " "throat, body aches and congestion, started 13 days ago. Still has phlegm. Has tested 3 times for covid and negative.  He has a fever. Wondering why he is still sick.  Has had a fever off and on. Sore throat for a couple of days. Was in bed feeling sick. Sx off and on. Last week felt really sick. But then better.   Sx started after he went to a crowded concert.  Has chest congestion          Review of Systems   Constitutional, HEENT, cardiovascular, pulmonary, gi and gu systems are negative, except as otherwise noted.      Objective    /70 (BP Location: Left arm, Patient Position: Sitting, Cuff Size: Adult Regular)   Pulse 78   Temp (!) 100.5  F (38.1  C) (Temporal)   Ht 1.676 m (5' 6\")   Wt 79.8 kg (176 lb)   SpO2 97%   BMI 28.41 kg/m    Body mass index is 28.41 kg/m .  Physical Exam   GENERAL: healthy, alert and no distress  EYES: Eyes grossly normal to inspection, PERRL and conjunctivae and sclerae normal  HENT: ear canals and TM's normal, nose and mouth without ulcers or lesions  RESP: lungs clear to auscultation - no rales, rhonchi or wheezes  CV: regular rate and rhythm, normal S1 S2, no S3 or S4, no murmur, click or rub, no peripheral edema and peripheral pulses strong  ABDOMEN: soft, nontender, no hepatosplenomegaly, no masses and bowel sounds normal  MS: no gross musculoskeletal defects noted, no edema  NEURO: Normal strength and tone, mentation intact and speech normal  PSYCH: mentation appears normal, affect normal/bright    Results for orders placed or performed in visit on 03/30/22   MONO SCREEN ([facility])     Status: None   Result Value Ref Range    Mononucleosis Screen neg Neg   HEMOGRAM PLATELET DIFF ([facility])     Status: None   Result Value Ref Range    WBC 8.5 4.0 - 11 10*9/L    RBC Count 4.45 4.4 - 5.9 10*12/L    Hemoglobin 13.8 13.3 - 17.7 g/dL    Hematocrit 40.1 40.0 - 53.0 %    MCV 90.2 78 - 100 fL    MCH 31.0 26 - 33 pg    MCHC 34.4 31 - 36 g/dL    Platelet Count 199 150 - 375 10^9/L    % " Granulocytes 60.0 %    % Lymphocytes 33.6 %    % Monocytes 6.4 %   Strep A ([facility])     Status: None   Result Value Ref Range    STREP A Negative Negative

## 2022-03-30 NOTE — NURSING NOTE
Chief Complaint   Patient presents with     URI     symptoms started 15 days ago with fever, sore throat, body aches, sore throat resolved, having congestion, sinus pressure in the morning , coughing yellow mucous, mulitple NEG covid tests     Pre-visit Screening:  Immunizations:  not up to date - tetanus declined today  Colonoscopy:  NA  Mammogram: NA  Asthma Action Test/Plan:  NA  PHQ9:  NA  GAD7:  NA  Questioned patient about current smoking habits Pt. Smokes on occasion  Ok to leave detailed message on voice mail for today's visit only Yes, phone # [phone]

## 2022-10-03 ENCOUNTER — LAB (OUTPATIENT)
Dept: LAB | Facility: CLINIC | Age: 22
End: 2022-10-03
Payer: COMMERCIAL

## 2022-10-03 ENCOUNTER — OFFICE VISIT (OUTPATIENT)
Dept: FAMILY MEDICINE | Facility: CLINIC | Age: 22
End: 2022-10-03

## 2022-10-03 VITALS
DIASTOLIC BLOOD PRESSURE: 70 MMHG | HEART RATE: 61 BPM | OXYGEN SATURATION: 99 % | BODY MASS INDEX: 27.28 KG/M2 | WEIGHT: 169 LBS | SYSTOLIC BLOOD PRESSURE: 108 MMHG | TEMPERATURE: 98 F | RESPIRATION RATE: 20 BRPM

## 2022-10-03 DIAGNOSIS — F12.20 TETRAHYDROCANNABINOL (THC) USE DISORDER, MODERATE, DEPENDENCE (H): ICD-10-CM

## 2022-10-03 DIAGNOSIS — B35.1 ONYCHOMYCOSIS: ICD-10-CM

## 2022-10-03 DIAGNOSIS — Z23 NEED FOR VACCINATION: ICD-10-CM

## 2022-10-03 DIAGNOSIS — Z02.1 DRUG TESTING, PRE-EMPLOYMENT: ICD-10-CM

## 2022-10-03 DIAGNOSIS — Z87.2 H/O INGROWN NAIL: ICD-10-CM

## 2022-10-03 DIAGNOSIS — L60.3 NAIL FRAGILITY: ICD-10-CM

## 2022-10-03 DIAGNOSIS — Z02.1 DRUG TESTING, PRE-EMPLOYMENT: Primary | ICD-10-CM

## 2022-10-03 LAB
AMPHETAMINE (QUAL): NEGATIVE
BARBITURATES (QUAL): NEGATIVE
BENZODIAZEPINES (QUAL): NEGATIVE
COCAINE (QUAL): NEGATIVE
CREAT UR-MCNC: 46 MG/DL
CREAT UR-MCNC: 46 MG/DL
ECSTASY OPIATES (QUAL): NEGATIVE
MARIJUANA (QUAL): POSITIVE
METHADONE (QUAL): NEGATIVE
METHAMPHETAMINE (QUAL): NEGATIVE
OPIATES (QUAL): NEGATIVE
OXYCODONE (QUAL): NEGATIVE
PCP (QUAL): NEGATIVE
TRICYCLIC ANTIDEPRESSANTS (QUAL): NEGATIVE

## 2022-10-03 PROCEDURE — 80305 DRUG TEST PRSMV DIR OPT OBS: CPT | Performed by: STUDENT IN AN ORGANIZED HEALTH CARE EDUCATION/TRAINING PROGRAM

## 2022-10-03 PROCEDURE — 90715 TDAP VACCINE 7 YRS/> IM: CPT | Performed by: STUDENT IN AN ORGANIZED HEALTH CARE EDUCATION/TRAINING PROGRAM

## 2022-10-03 PROCEDURE — 82570 ASSAY OF URINE CREATININE: CPT

## 2022-10-03 PROCEDURE — 80349 CANNABINOIDS NATURAL: CPT

## 2022-10-03 PROCEDURE — 90471 IMMUNIZATION ADMIN: CPT | Performed by: STUDENT IN AN ORGANIZED HEALTH CARE EDUCATION/TRAINING PROGRAM

## 2022-10-03 PROCEDURE — 99213 OFFICE O/P EST LOW 20 MIN: CPT | Mod: 25 | Performed by: STUDENT IN AN ORGANIZED HEALTH CARE EDUCATION/TRAINING PROGRAM

## 2022-10-03 NOTE — PROGRESS NOTES
Assessment & Plan       ICD-10-CM    1. Drug testing, pre-employment  Z02.1 Urine Drug Screen ([facility])     THC Confirmation Quantitative Urine     Creatinine random urine   2. Onychomycosis  B35.1 Orthopedic  Referral   3. Nail fragility  L60.3 Orthopedic  Referral   4. H/O ingrown nail  Z87.2 Orthopedic  Referral   5. Tetrahydrocannabinol (THC) use disorder, moderate, dependence (H)  F12.20 THC Confirmation Quantitative Urine     Creatinine random urine   6. Need for vaccination  Z23 TDAP VACCINE (Adacel, Boostrix)  [[id number]]     Testing as above, offered substance use support resources but declines. Referral for podiatry in again. Will follow-up prn.     Jan Childress MD, [facility]       Subjective     Aren Emanuel is a 22 year old male who presents to clinic today for the following health issues:    HPI   Chief Complaint   Patient presents with     Consult For     Wants to have a drug test done to know everything that is in his system, quit smoking marijuana awhile ago and wants to make sure it is out of his system     Ingrown Toenail     Has ingrown toenails on both feet, has had this before and saw a podiatrist but may need a referral to see one again since it has been awhile         Pt would like to see where THC levels are today. Does worry about getting tested at work.  Daily THC use for some time but working to decrease  Has taken home tests and was concerned his levels were too high  Motivated to lose weight and be healthier overall  Otherwise denies any MH concerns, feels well.           Objective    /70 (BP Location: Left arm, Patient Position: Sitting, Cuff Size: Adult Large)   Pulse 61   Temp 98  F (36.7  C) (Temporal)   Resp 20   Wt 76.7 kg (169 lb)   SpO2 99%   BMI 27.28 kg/m    Body mass index is 27.28 kg/m .  Alert, NAD  NC/AT  Sclerae anicteric  Regular  Resp nonlabored  Skin warm and dry  No focal neuro deficits. Speech intact.    Appropriate affect  Normal gait.    Results for orders placed or performed in visit on 10/03/22   Creatinine random urine     Status: None   Result Value Ref Range    Creatinine Urine mg/dL 46.0 mg/dL   THC Confirmation Quantitative Urine     Status: None   Result Value Ref Range    THC Metabolite 591 ng/mL    THC/Creatinine Ratio 1,285 ng/mg Creat    Narrative    This test was developed and its performance characteristics determined by the [facility],  Special [facility] Laboratory. It has not been cleared or approved by the FDA. The laboratory is regulated under CLIA as qualified to perform high-complexity testing. This test is used for clinical purposes. It should not be regarded as investigational or for research.   Urine Creatinine for Drug Screen Panel     Status: None   Result Value Ref Range    Creatinine Urine for Drug Screen 46 mg/dL   THC Confirmation Quantitative Urine     Status: None    Narrative    The following orders were created for panel order THC Confirmation Quantitative Urine.  Procedure                               Abnormality         Status                     ---------                               -----------         ------                     THC Confirmation Quantit...[[id number]]                      Final result               Urine Creatinine for [place]...[[id number]]                      Final result                 Please view results for these tests on the individual orders.   Results for orders placed or performed in visit on 10/03/22   Urine Drug Screen ([facility])     Status: Abnormal   Result Value Ref Range    AMPHETAMINE (QUAL) Negative Negative    BARBITURATES (QUAL) Negative Negative    BENZODIAZEPINES (QUAL) Negative Negative    Cocaine (QUAL) Negative Negative    Methamphetamine (QUAL) Negative Negative    Ecstasy Opiates (QUAL) Negative Negative    Methadone (QUAL) Negative Negative    Oxycodone Negative Negative    PCP (QUAL) Negative Negative     TRICYCLIC ANTIDEPRESSANTS (QUAL) Negative Negative    Marijuana (QUAL) Positive (A) Negative    OPIATES (QUAL) Negative Negative

## 2022-10-03 NOTE — PATIENT INSTRUCTIONS
Go to [facility] outpatient lab (just inside main entrance) to leave a urine sample     Follow-up anytime    Call to schedule Podiatry visit

## 2022-10-03 NOTE — NURSING NOTE
Chief Complaint   Patient presents with     Consult For     Wants to have a drug test done to know everything that is in his system, quit smoking marijuana awhile ago and wants to make sure it is out of his system     Ingrown Toenail     Has ingrown toenails on both feet, has had this before and saw a podiatrist but may need a referral to see one again since it has been awhile      Pre-visit Screening:  Immunizations:  not up to date - due for tetanus shot   Colonoscopy:  NA  Mammogram: NA  Asthma Action Test/Plan:  NA  PHQ9:  NA  GAD7:  NA  Questioned patient about current smoking habits Pt. currently smokes.  Advised about smoking cessation.  Ok to leave detailed message on voice mail for today's visit only Yes, phone # [phone]

## 2022-10-05 LAB
CANNABINOIDS UR CFM-MCNC: 591 NG/ML
CARBOXYTHC/CREAT UR: 1285 NG/MG CREAT

## 2022-10-17 ENCOUNTER — OFFICE VISIT (OUTPATIENT)
Dept: FAMILY MEDICINE | Facility: CLINIC | Age: 22
End: 2022-10-17

## 2022-10-17 VITALS
DIASTOLIC BLOOD PRESSURE: 62 MMHG | SYSTOLIC BLOOD PRESSURE: 106 MMHG | RESPIRATION RATE: 20 BRPM | TEMPERATURE: 98 F | OXYGEN SATURATION: 97 % | BODY MASS INDEX: 28.25 KG/M2 | WEIGHT: 175 LBS | HEART RATE: 63 BPM

## 2022-10-17 DIAGNOSIS — G25.89 SCAPULAR DYSKINESIS: ICD-10-CM

## 2022-10-17 DIAGNOSIS — G89.29 CHRONIC LEFT SHOULDER PAIN: Primary | ICD-10-CM

## 2022-10-17 DIAGNOSIS — M25.512 CHRONIC LEFT SHOULDER PAIN: Primary | ICD-10-CM

## 2022-10-17 PROCEDURE — 73030 X-RAY EXAM OF SHOULDER: CPT | Mod: LT | Performed by: STUDENT IN AN ORGANIZED HEALTH CARE EDUCATION/TRAINING PROGRAM

## 2022-10-17 PROCEDURE — 99214 OFFICE O/P EST MOD 30 MIN: CPT | Performed by: STUDENT IN AN ORGANIZED HEALTH CARE EDUCATION/TRAINING PROGRAM

## 2022-10-17 RX ORDER — MELOXICAM 15 MG/1
15 TABLET ORAL
Qty: 30 TABLET | Refills: 1 | Status: SHIPPED | OUTPATIENT
Start: 2022-10-17 | End: 2022-12-13

## 2022-10-17 NOTE — PROGRESS NOTES
ASSESSMENT & PLAN      ICD-10-CM    1. Chronic left shoulder pain  M25.512 XR Shoulder Left G/E 3 Views    G89.29 Physical Therapy Referral     meloxicam (MOBIC) 15 MG tablet      2. Scapular dyskinesis  G25.89 Physical Therapy Referral         Suspect lat strain and scap dyskinesis. XRs obtained and reviewed with patient, normal.    Patient Instructions   Call to schedule when you can  Physical Therapy  [facility]   [address], [place] [address]  [phone] -- appt line    Try meloxicam 1-2 hours before bed. One pill daily maximum    Can continue ice or heat as needed for 10-15 min a time    Follow-up if not improving over next few weeks, sooner if other concerns      >30 minutes spent on the date of the encounter doing chart review, history and exam, documentation and further activities per the note.    Jan Childress MD, [facility]      -----    SUBJECTIVE  Aren Emanuel is a/an 22 year old male who is seen for evaluation of     Chief Complaint   Patient presents with     Back Pain     Mid left side back back that goes up near shoulder blade, has been around for awhile, notices while lifting things more, pain is more severe at times but on and off      The patient is seen by themselves.  The patient is Right handed    Date of Onset: 1.5 mos ago  Mechanism of injury: initial pain occurred while doing  press, had been trying to increase length and intensity of workouts  Location of Pain: left shoulder blade/posterior shoulder  Worsened by: shoulder abduction  Better with: rest, chiro, stretching, ice  Treatments tried: no other treatments  Associated symptoms:  No associated cardiopulm sx. No distal numbness or tingling; denies swelling or warmth    Orthopedic/Surgical history: NO prior back or shoulder injuries    Patient's PMH, PSH, and family hx reviewed.      OBJECTIVE:  /62 (BP Location: Right arm, Patient Position: Sitting, Cuff Size: Adult  Large)   Pulse 63   Temp 98  F (36.7  C) (Temporal)   Resp 20   Wt 79.4 kg (175 lb)   SpO2 97%   BMI 28.25 kg/m     General: healthy, alert and in no distress  HEENT: no scleral icterus or conjunctival erythema  Skin: no visible suspicious lesions or rashes.   CV: no pedal edema  Resp: normal respiratory effort without conversational dyspnea   Psych: normal mood and affect  Gait: normal, with appropriate coordination and balance     MSK:   L shoulder wo deformity  Full and symm AROM 160/160/50/T6. Mild pain with all movements.  5/5 str in abd/ER/IR  Neg empty can  Pain reproduced with resisted shoulder adduction and ER. No weakness.  +scap dysk  Neg Speeds and yergasons  Neg Mireles  CMS intact distally      RADIOLOGY:  XR Shoulder Left G/E 3 Views    Result Date: 10/17/2022  Radiologist Consultation/:  Fax: [phone] _____________________________________________________________________________________________________________________________________________________________________________________________________________________________________________________________________________________________________________________________________________________________________________________________________________________________________________________________________________________________________ PATIENT NAME: DANAY MCDOWELL RAHULRICARDO YOB: 2000 Age: 22 ACCESSION NUMBER: [id number] SEX: M ORDERING PROVIDER: Jan Childress FACILITY: [facility] PRIMARY PROVIDER: PATIENT ID: [id number] INTERESTED PARTY: Page 1 of 1  _________________________________________________________________________________________________________________________________________________________________________________________________________________________________________________________________________________________________________________________________________________________________________________________ EXAM: XRAY SHOULDER,2+VWS LEFT LOCATION: [facility] DATE/TIME: 10/17/2022 1:09 PM INDICATION: Chronic left shoulder pain. COMPARISON: None. IMPRESSION: Normal joint spaces and alignment. No fracture. SIGNED BY: Michele Hernandez MD 10/17/2022 7:56 PM

## 2022-10-17 NOTE — PATIENT INSTRUCTIONS
Call to schedule when you can  Physical Therapy  [facility]   [address], [place] [address]  [phone] -- appt line    Try meloxicam 1-2 hours before bed. One pill daily maximum    Can continue ice or heat as needed for 10-15 min a time    Follow-up if not improving over next few weeks, sooner if other concerns

## 2022-10-17 NOTE — NURSING NOTE
Chief Complaint   Patient presents with     Back Pain     Mid left side back back that goes up near shoulder blade, has been around for awhile, notices while lifting things more, pain is more severe at times but on and off      Pre-visit Screening:  Immunizations:  up to date  Colonoscopy:  NA  Mammogram: NA  Asthma Action Test/Plan:  NA  PHQ9:  NA  GAD7:  NA  Questioned patient about current smoking habits Pt. currently smokes.  Advised about smoking cessation.  Ok to leave detailed message on voice mail for today's visit only yes, phone # [phone]

## 2022-12-08 ENCOUNTER — TELEPHONE (OUTPATIENT)
Dept: SURGERY | Facility: CLINIC | Age: 22
End: 2022-12-08

## 2022-12-08 ENCOUNTER — HOSPITAL ENCOUNTER (EMERGENCY)
Facility: CLINIC | Age: 22
Discharge: HOME | End: 2022-12-08
Attending: EMERGENCY MEDICINE | Admitting: EMERGENCY MEDICINE
Payer: COMMERCIAL

## 2022-12-08 VITALS
SYSTOLIC BLOOD PRESSURE: 113 MMHG | RESPIRATION RATE: 16 BRPM | OXYGEN SATURATION: 98 % | TEMPERATURE: 97.3 F | DIASTOLIC BLOOD PRESSURE: 40 MMHG | HEIGHT: 66 IN | HEART RATE: 71 BPM | BODY MASS INDEX: 27.32 KG/M2 | WEIGHT: 170 LBS

## 2022-12-08 DIAGNOSIS — G89.18 POSTOPERATIVE PAIN: ICD-10-CM

## 2022-12-08 PROCEDURE — 99282 EMERGENCY DEPT VISIT SF MDM: CPT

## 2022-12-08 ASSESSMENT — ENCOUNTER SYMPTOMS
VOMITING: 0
ABDOMINAL PAIN: 1
FEVER: 0
WOUND: 0

## 2022-12-08 NOTE — LETTER
December 8, 2022      To Whom It May Concern:      Aren Emanuel was seen in our Emergency Department today, 12/08/22.   He can be off for 2 days  Until cleared by his surgeon.  Sincerely,        Rubia Kaiser RN

## 2022-12-08 NOTE — TELEPHONE ENCOUNTER
Patient had a hernia repair with [facility] 8/26/2020. Patient was having pain in the area at work and they sent him home and said he cannot return until cleared by a doctor.  He called his PCP and they aid he should f/u with [facility], but there are no appointments open until 1/13/23 or 1/17/23, he cannot be out of work that long.  Although he has not been seen in over a year he asked if we could advise next steps for him so he can get back to work.    Please call    Phone: [phone]  Message ok

## 2022-12-08 NOTE — LETTER
December 8, 2022      To Whom It May Concern:      Aren QUEZADA Al Adriana was seen in our Emergency Department today, 12/08/22.  I expect his condition to improve over the next 2 days.  He may return to work 12/12/2023. Sincerely,        Rubia Kaiser RN

## 2022-12-09 NOTE — ED PROVIDER NOTES
History     Chief Complaint:  Umbilical pain    HPI   Aren Emanuel is a 22 year old male who presents with umbilical pain, he states he had umbilical hernia repair approximately 2 years ago by Dr. Loyola.  He states that he has had no significant issues since that time however while at work within the last week he mentions some discomfort in the area of his mesh and his boss subsequently stated he needed a letter to return to work.  He has contacted his surgeon and primary care office and has not been able to obtain a visit therefore he presents to the emergency department this evening.  He denies fevers denies nausea vomiting there are no further aggravating or alleviating factors no further associated symptoms.    ROS:  Review of Systems   Constitutional: Negative for fever.   Gastrointestinal: Positive for abdominal pain. Negative for vomiting.   Skin: Negative for wound.         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)      Anxiety      Attention deficit disorder with hyperactivity(314.01)      Bipolar I disorder, most recent episode (or current) unspecified      Cannabis abuse      Depression      Hernia, abdominal      Prostate infection 10/2019       Past Surgical History:    Past Surgical History:   Procedure Laterality Date     CYSTOSCOPY       HERNIORRHAPHY UMBILICAL N/A 08/26/2020    Procedure: OPEN UMBILICAL HERNIA REPAIR;  Surgeon: Alisha Montero MD;  Location:  OR     LAPAROSCOPIC HERNIORRHAPHY INGUINAL BILATERAL Bilateral 08/26/2020    Procedure: LAPAROSCOPIC BILATERAL INGUINAL HERNIA REPAIR WITH MESH;  Surgeon: Alisha Montero MD;  Location:  OR     TONSILLECTOMY       XR WRIST SURGERY JAMES RIGHT  2017        Family History:    family history includes Anxiety Disorder in his sister; Bipolar Disorder in his mother; Depression in his  "brother, mother, and sister; Hyperlipidemia in his father; Other - See Comments in his brother; Seizure Disorder in his sister.    Social History:   reports that he has been smoking cigarettes. He uses smokeless tobacco. He reports current alcohol use. He reports current drug use. Drugs: Marijuana and Amphetamines.  PCP: Jan Childress     Physical Exam     Patient Vitals for the past 24 hrs:   BP Temp Temp src Pulse Resp SpO2 Height Weight   12/08/22 2111 113/40 97.3  F (36.3  C) Oral 71 16 98 % 1.676 m (5' 6\") 77.1 kg (170 lb)        Physical Exam  General: Alert, interactive   Head:  Scalp is atraumatic  Eyes:  The pupils are equal, round, and reactive to light    EOM's intact    No scleral icterus  ENT:      Nose:  The external nose is normal  Ears:  External ears are normal     Neck:  Normal range of motion.      There is no rigidity.    Trachea is in the midline         CV:  Well-perfused  Resp:  Non-labored, no retractions or accessory muscle use      GI:  Abdomen is soft, no distension, mild tenderness in the umbilicus, no appreciated hernia.       MS:  Normal strength in all 4 extremities  Skin:  Warm and dry, No rash or lesions noted.    Psych:  Awake. Alert.  Normal affect.      Appropriate interactions.    Emergency Department Course   Emergency Department Course:  Reviewed:  I reviewed nursing notes and vitals    Assessments:   I obtained history and examined the patient as noted above.   Interventions:  Medications - No data to display     Disposition:  The patient was discharged to home.     Impression & Plan    Medical Decision Making:  Following presentation history and physical examination were performed, previous records reviewed.  He has mild umbilical tenderness but no obvious appreciated hernia, no signs of infection.  He is requesting a letter to return to work however I believe this should be obtained from his surgeon.  There is no signs of an acute infectious etiology and I do not " believe he needs advanced imaging or laboratory work-up, I recommended Tylenol and ibuprofen for pain management.    Diagnosis:    ICD-10-CM    1. Postoperative pain  G89.18               12/8/2022   TriggerDony,*      Dony Andrade MD  12/08/22 2135

## 2022-12-09 NOTE — ED TRIAGE NOTES
Patient here for a work note     Triage Assessment     Row Name 12/08/22 2112       Triage Assessment (Adult)    Airway WDL WDL       Respiratory WDL    Respiratory WDL WDL       Skin Circulation/Temperature WDL    Skin Circulation/Temperature WDL WDL       Cardiac WDL    Cardiac WDL WDL       Peripheral/Neurovascular WDL    Peripheral Neurovascular WDL WDL       Cognitive/Neuro/Behavioral WDL    Cognitive/Neuro/Behavioral WDL WDL

## 2022-12-09 NOTE — TELEPHONE ENCOUNTER
Left message for patient informing him that Dr. ashton can see him on Tuesday at 3:30    Asked that patient call back to confirm that this appt works for him    Call back number provided    Petra Garrett, RN-BSN

## 2022-12-13 ENCOUNTER — OFFICE VISIT (OUTPATIENT)
Dept: SURGERY | Facility: CLINIC | Age: 22
End: 2022-12-13
Payer: COMMERCIAL

## 2022-12-13 DIAGNOSIS — K42.9 UMBILICAL HERNIA WITHOUT OBSTRUCTION AND WITHOUT GANGRENE: Primary | ICD-10-CM

## 2022-12-13 PROCEDURE — 99214 OFFICE O/P EST MOD 30 MIN: CPT | Performed by: SURGERY

## 2022-12-13 NOTE — LETTER
[facility] [place]  [address], [address]  [facility] [id number] [phone]          December 13, 2022    RE:  Aren Emanuel                                                                                                                                                       [address]  [facility] [address]            To whom it may concern:    Aren Emanuel is under my professional care and saw me for a clinic visit today regarding abdominal pain. He has a small umbilical hernia which is causing this discomfort. He is ok to return to work from my perspective. He should limit lifting to <40lbs. He may need this repaired in the future pending his symptoms.     Sincerely,        lAisha Montero MD  [facility]  [phone]

## 2022-12-13 NOTE — LETTER
December 13, 2022          Jan Childress MD      RE:   Aren Emanuel 2000      Dear Colleague,    Thank you for referring your patient, Aren Emanuel, to [facility] at [facility]. Please see a copy of my visit note below.    Aren Emanuel is a 22 year old male who is seen in consultation at the request of himself for evaluation of abdominal pain.      He underwent a laparoscopic bilateral inguinal hernia repair with mesh and primary umbilical hernia repair (no mesh) on 8/26/2020. He had a difficult recovery with bilateral groin pain for many months. He did have resolution of groin pain but more recently has had pain around his umbilicus. He was at work last week and was grabbing his abdomen and a coworker noticed and his supervisor discussed with him that he needed a MD note to return to work due to concern for a hernia. 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 He does think he feels a lump at the umbilicus which is tender.      He was seen in the ER to try to get a letter and was told to see me for evaluation. He has no pain in the groin anymore.      REVIEW OF SYSTEMS:  10 point review of systems completed and otherwise negative aside from as listed in HPI.      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)       Anxiety       Attention deficit disorder with hyperactivity(314.01)       Bipolar I disorder, most recent episode (or current) unspecified       Cannabis abuse       Depression       Hernia, abdominal       Prostate infection 10/2019            Past Surgical History         Past Surgical History:   Procedure Laterality Date     CYSTOSCOPY         HERNIORRHAPHY UMBILICAL N/A 08/26/2020     Procedure: OPEN UMBILICAL HERNIA REPAIR;  Surgeon: Alisha Montero MD;  Location:  OR      LAPAROSCOPIC HERNIORRHAPHY INGUINAL BILATERAL Bilateral 08/26/2020     Procedure: LAPAROSCOPIC BILATERAL INGUINAL HERNIA REPAIR WITH MESH;  Surgeon: Alisha Montero MD;  Location: [facility] OR     TONSILLECTOMY         XR WRIST SURGERY JAMES RIGHT   2017            Family History         Family History   Problem Relation Age of Onset     Depression Mother       Bipolar Disorder Mother       Hyperlipidemia Father       Seizure Disorder Sister       Depression Brother           estranged     Other - See Comments Brother           PTDS     Depression Sister       Anxiety Disorder Sister              Social History            Tobacco Use     Smoking status: Some Days       Types: Cigarettes     Smokeless tobacco: Current     Tobacco comments:       e cig   Substance Use Topics     Alcohol use: Yes       Comment: occaisional              Patient Active Problem List   Diagnosis     Attention deficit hyperactivity disorder (ADHD)     Bipolar I disorder (H)     Health Care Home     ACP (advance care planning)     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)     Cannabis abuse, episodic     Bilateral inguinal hernia     Umbilical hernia     Anxiety and depression     Cannabis dependence (H)     Chronic prostatitis     Vitamin D deficiency         No Known Allergies     Current Outpatient Prescriptions          Current Outpatient Medications   Medication Sig Dispense Refill     meloxicam (MOBIC) 15 MG tablet Take 1 tablet (15 mg) by mouth nightly as needed for moderate pain 30 tablet 1               EXAM:  GENERAL: healthy, alert and no distress   PSYCH: pleasant, normal affect  HEENT: moist mucus membranes, no scleral icterus  CARDIOVASCULAR:  RRR  RESPIRATORY: non labored breathing  GI: soft, small 8mm umbilical hernia at superior aspect of umbilicus which is reducible. Tender with reduction, nondistended,, no hepatosplenomegaly, normal bowel sound  Extremities: warm and well perfused, no edema  SKIN: No suspicious lesions or rashes     LYMPH: no axillary adenopathy        ASSESSMENT:  Aren Emanuel is a 22 year old with a PMH s/f prior laparoscopic inguinal hernia repair and umbilical hernia repair who presents with a recurrent umbilical hernia. We discussed that I repaired the umbilical hernia primarily as the defect was very small at the time at <1cm and to try to avoid mesh at the umbilicus as it can complicate future surgeries; however, the risk of recurrence is higher. We discussed his options going forward. The recurrence is small but with hernias, they tend to increase with time in size. I am fine with him returning to work and would try to limit lifting (<40lbs).  We discussed his surgical options and I would recommend an open repair with mesh given the small size. He would like to wait on surgery for now as he needs to work for a certain amount of time before his sick leave would apply. I think this is fine and he can fix this electively in the future.         PLAN:  Ok to return to work letter given  Eventual open umbilical hernia repair with mesh    Again, thank you for allowing me to participate in the care of your patient.      Sincerely,      Alisha Montero MD

## 2022-12-13 NOTE — PROGRESS NOTES
[facility] Consultation    CHIEF COMPLAINT:  Chief Complaint   Patient presents with     Post-Op - General Surgery     Return: pain in prior surgical area (umbilical).Bilateral inguinal; open umbilical 8/2020 Can not return to work until evaluated. Appt ok per        HISTORY OF PRESENT ILLNESS:  Aren Emanuel is a 22 year old male who is seen in consultation at the request of himself for evaluation of abdominal pain.     He underwent a laparoscopic bilateral inguinal hernia repair with mesh and primary umbilical hernia repair (no mesh) on 8/26/2020. He had a difficult recovery with bilateral groin pain for many months. He did have resolution of groin pain but more recently has had pain around his umbilicus. He was at work last week and was grabbing his abdomen and a coworker noticed and his supervisor discussed with him that he needed a MD note to return to work due to concern for a hernia. 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 He does think he feels a lump at the umbilicus which is tender.     He was seen in the ER to try to get a letter and was told to see me for evaluation. He has no pain in the groin anymore.     REVIEW OF SYSTEMS:  10 point review of systems completed and otherwise negative aside from as listed in HPI.     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)      Anxiety      Attention deficit disorder with hyperactivity(314.01)      Bipolar I disorder, most recent episode (or current) unspecified      Cannabis abuse      Depression      Hernia, abdominal      Prostate infection 10/2019       Past Surgical History:   Procedure Laterality Date     CYSTOSCOPY       HERNIORRHAPHY UMBILICAL N/A 08/26/2020    Procedure: OPEN UMBILICAL HERNIA REPAIR;  Surgeon: Alisha Montero MD;  Location: [facility] OR     LAPAROSCOPIC HERNIORRHAPHY  INGUINAL BILATERAL Bilateral 08/26/2020    Procedure: LAPAROSCOPIC BILATERAL INGUINAL HERNIA REPAIR WITH MESH;  Surgeon: Alisha Montero MD;  Location: [facility] OR     TONSILLECTOMY       XR WRIST SURGERY JAMES RIGHT  2017       Family History   Problem Relation Age of Onset     Depression Mother      Bipolar Disorder Mother      Hyperlipidemia Father      Seizure Disorder Sister      Depression Brother         estranged     Other - See Comments Brother         PTDS     Depression Sister      Anxiety Disorder Sister        Social History     Tobacco Use     Smoking status: Some Days     Types: Cigarettes     Smokeless tobacco: Current     Tobacco comments:     e cig   Substance Use Topics     Alcohol use: Yes     Comment: occaisional        Patient Active Problem List   Diagnosis     Attention deficit hyperactivity disorder (ADHD)     Bipolar I disorder (H)     Health Care Home     ACP (advance care planning)     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)     Cannabis abuse, episodic     Bilateral inguinal hernia     Umbilical hernia     Anxiety and depression     Cannabis dependence (H)     Chronic prostatitis     Vitamin D deficiency       No Known Allergies    Current Outpatient Medications   Medication Sig Dispense Refill     meloxicam (MOBIC) 15 MG tablet Take 1 tablet (15 mg) by mouth nightly as needed for moderate pain 30 tablet 1         EXAM:  GENERAL: healthy, alert and no distress   PSYCH: pleasant, normal affect  HEENT: moist mucus membranes, no scleral icterus  CARDIOVASCULAR:  RRR  RESPIRATORY: non labored breathing  GI: soft, small 8mm umbilical hernia at superior aspect of umbilicus which is reducible. Tender with reduction, nondistended,, no hepatosplenomegaly, normal bowel sound  Extremities: warm and well perfused, no edema  SKIN: No suspicious lesions or rashes    LYMPH: no axillary adenopathy      ASSESSMENT:  Aren Emanuel is a 22 year old with a PMH s/f prior laparoscopic inguinal hernia repair and  umbilical hernia repair who presents with a recurrent umbilical hernia. We discussed that I repaired the umbilical hernia primarily as the defect was very small at the time at <1cm and to try to avoid mesh at the umbilicus as it can complicate future surgeries; however, the risk of recurrence is higher. We discussed his options going forward. The recurrence is small but with hernias, they tend to increase with time in size. I am fine with him returning to work and would try to limit lifting (<40lbs).  We discussed his surgical options and I would recommend an open repair with mesh given the small size. He would like to wait on surgery for now as he needs to work for a certain amount of time before his sick leave would apply. I think this is fine and he can fix this electively in the future.       PLAN:  Ok to return to work letter given  Eventual open umbilical hernia repair with mesh    30 minutes total time spent on the date of this encounter doing: chart review, review of test results, patient visit, physical exam, education, counseling, developing plan of care, and documenting.    It was my pleasure to participate in the care of Aren Emanuel in clinic today. Thank you for this consultation.         Alisha Montero MD    Please route or send letter to:  Primary Care Provider (PCP) and Referring Provider

## 2023-01-23 ENCOUNTER — OFFICE VISIT (OUTPATIENT)
Dept: FAMILY MEDICINE | Facility: CLINIC | Age: 23
End: 2023-01-23

## 2023-01-23 VITALS
WEIGHT: 170 LBS | OXYGEN SATURATION: 98 % | SYSTOLIC BLOOD PRESSURE: 116 MMHG | BODY MASS INDEX: 27.32 KG/M2 | TEMPERATURE: 98.2 F | HEIGHT: 66 IN | DIASTOLIC BLOOD PRESSURE: 68 MMHG | HEART RATE: 98 BPM

## 2023-01-23 DIAGNOSIS — M54.41 ACUTE RIGHT-SIDED LOW BACK PAIN WITH RIGHT-SIDED SCIATICA: Primary | ICD-10-CM

## 2023-01-23 DIAGNOSIS — F12.20 CANNABIS DEPENDENCE (H): ICD-10-CM

## 2023-01-23 DIAGNOSIS — F15.11 AMPHETAMINE ABUSE IN REMISSION (H): ICD-10-CM

## 2023-01-23 PROCEDURE — 99214 OFFICE O/P EST MOD 30 MIN: CPT | Performed by: STUDENT IN AN ORGANIZED HEALTH CARE EDUCATION/TRAINING PROGRAM

## 2023-01-23 PROCEDURE — 72100 X-RAY EXAM L-S SPINE 2/3 VWS: CPT | Performed by: STUDENT IN AN ORGANIZED HEALTH CARE EDUCATION/TRAINING PROGRAM

## 2023-01-23 RX ORDER — MELOXICAM 15 MG/1
15 TABLET ORAL DAILY
Qty: 30 TABLET | Refills: 0 | Status: SHIPPED | OUTPATIENT
Start: 2023-01-23 | End: 2023-07-12

## 2023-01-23 NOTE — PROGRESS NOTES
ASSESSMENT & PLAN      ICD-10-CM    1. Acute right-sided low back pain with right-sided sciatica  M54.41 XR Lumbar Spine 2/3 Views     meloxicam (MOBIC) 15 MG tablet     Physical Therapy Referral     CANCELED: Physical Therapy Referral      2. Cannabis dependence (H)  F12.20       3. 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 Discussed nature of radicular back pain and tx options. Recommend NSAIDS, PT, and activity modification prn. Close follow-up. Reasons to seek emergent care reviewed. Pt in agreement with plan.     Patient Instructions   XRs today    PT schedulers will reach out to you    Restart meloxicam once daily    Work note done - let me know if need adjustments    If not improving would have you see spine specialist      >30 minutes spent on the date of the encounter doing chart review, history and exam, documentation and further activities per the note.    Jan Childress MD, [facility]      -----    SUBJECTIVE  Aren DAMIEN Martin Adriana is a/an 22 year old male who is seen for evaluation of     Chief Complaint   Patient presents with     Back Pain     Low back pain, was trying new equipment at the gym yesterday, he noticed slight pain while working out and the pain progressively got worse, feels a tightness, standing and walking make the pain worse, needs note to get out of work        The patient is seen by themselves.  Date of Onset: yesterday  Mechanism of injury: Patient describes injury as doing squat type lift at gym when pain started  Location of Pain: right low back, some radiation into buttock  Worsened by: weight bearing, leaning forward  Better with: unsure  Treatments tried: rest/activity avoidance  Associated symptoms: no distal numbness or tingling; denies swelling or warmth    Patient's PMH, PSH, and family hx reviewed. Hx of polysubstance abuse, THC only now.       OBJECTIVE:  /68 (BP  "Location: Right arm, Patient Position: Sitting, Cuff Size: Adult Large)   Pulse 98   Temp 98.2  F (36.8  C) (Temporal)   Ht 1.676 m (5' 6\")   Wt 77.1 kg (170 lb)   SpO2 98%   BMI 27.44 kg/m     General: healthy, alert, uncomfortable but in no distress  HEENT: no scleral icterus or conjunctival erythema  Skin: no visible suspicious lesions or rashes.   CV: no pedal edema   Resp: normal respiratory effort without conversational dyspnea   Psych: normal mood and affect  Gait: antalgic    MSK:   Lumbar ROM limited by pain globally  Pain limits strength testing but grossly symmetric/intact  SILT BLE  Symm patellar and achilles reflexes  +SLR on slump on R    RADIOLOGY:  Results for orders placed or performed in visit on 01/23/23   XR Lumbar Spine 2/3 Views     Status: None    Narrative    Radiologist Consultation/:   Fax:  [phone]  [phone]  _____________________________________________________________________________________________________________________________________________________________________________________________________________________________________________________________________________________________________________________________________________________________________________________________________________________________________________________________________________________________________  PATIENT NAME: WINTER DAMICO  YOB: 2000 Age: 22 ACCESSION NUMBER: [id number]  SEX: M ORDERING PROVIDER: Jan Childress  FACILITY: [facility] PRIMARY PROVIDER:  PATIENT ID: [id number] INTERESTED PARTY:  Page 1 of " 1  _________________________________________________________________________________________________________________________________________________________________________________________________________________________________________________________________________________________________________________________________________________________________________________________  EXAM: XRAY LUMBAR SPINE-2 OR 3 VIEW  LOCATION: [facility]  DATE/TIME: 1/23/2023 1:23 PM  INDICATION: Acute low back pain, right radicular pain.  COMPARISON: None.  IMPRESSION: No fracture. Normal vertebral heights and alignment. Normal disc spaces and facets for age.  Normal extraspinal structures. There is slight levoscoliosis of the upper lumbar spine.  SIGNED BY: Jenny Benavides MD 1/23/2023 8:05 PM

## 2023-01-23 NOTE — NURSING NOTE
Chief Complaint   Patient presents with     Back Pain     Low back pain, was trying new equipment at the gym yesterday, he noticed slight pain while working out and the pain progressively got worse, feels a tightness, standing and walking make the pain worse, needs note to get out of work      Pre-visit Screening:  Immunizations:  up to date  Colonoscopy:  NA  Mammogram: NA  Asthma Action Test/Plan:  NA  PHQ9:  NA  GAD7:  NA  Questioned patient about current smoking habits Pt. smokes   Ok to leave detailed message on voice mail for today's visit only Yes, phone # [phone]

## 2023-01-23 NOTE — PATIENT INSTRUCTIONS
XRs today    PT schedulers will reach out to you    Restart meloxicam once daily    Work note done - let me know if need adjustments    If not improving would have you see spine specialist

## 2023-01-23 NOTE — LETTER
January 23, 2023      Aren QUEZADA Al Adriana  [address]  [facility] [address]        To Whom It May Concern:    Aren QUEZADA Mario Footead  was seen today.  Please excuse him from work through 1/29/23 due to injury.        Sincerely,          Jan Childress MD, [facility]

## 2023-01-24 ENCOUNTER — THERAPY VISIT (OUTPATIENT)
Dept: PHYSICAL THERAPY | Facility: CLINIC | Age: 23
End: 2023-01-24
Attending: STUDENT IN AN ORGANIZED HEALTH CARE EDUCATION/TRAINING PROGRAM
Payer: COMMERCIAL

## 2023-01-24 DIAGNOSIS — M54.41 BILATERAL LOW BACK PAIN WITH RIGHT-SIDED SCIATICA: ICD-10-CM

## 2023-01-24 DIAGNOSIS — M54.41 ACUTE RIGHT-SIDED LOW BACK PAIN WITH RIGHT-SIDED SCIATICA: ICD-10-CM

## 2023-01-24 PROCEDURE — 97110 THERAPEUTIC EXERCISES: CPT | Mod: GP | Performed by: PHYSICAL THERAPIST

## 2023-01-24 PROCEDURE — 97161 PT EVAL LOW COMPLEX 20 MIN: CPT | Mod: GP | Performed by: PHYSICAL THERAPIST

## 2023-01-24 PROCEDURE — 97530 THERAPEUTIC ACTIVITIES: CPT | Mod: GP | Performed by: PHYSICAL THERAPIST

## 2023-01-24 NOTE — PROGRESS NOTES
"Physical Therapy Initial Evaluation  Subjective:  The history is provided by the patient. No  was used.   Therapist Generated HPI Evaluation  Problem details: 1-22-23 was at the [facility] and doing a lift with a barbell between his legs in a squat position (t-bar squat) with 125# and \"I felt something go in my back\".  He has had this pain/problem before, intermittent since 2020 when he lifted/moved a heavy object in a \"dead lift position\".   Typically his LBP is on left side, had MRI December 2021 (in Epic).    After this injury he went home and took a nap, and woke with severe pain right>left LB, buttocks, bilateral anterior hips and right proximal thigh.  He needed help yesterday to get in/out of car. Pain is constant, ranges 6-8/10, described as \"dull\" and intermittently \"sharp\".  Symptoms increase with right heel hitting the floor with each step walking, transition sit-stand, losing 3-4 hours sleep, bending forward, squatting, needs assistance with getting socks on, sitting 30', up stairs (significant UE assistance on railing with using right LE).  Symptoms decrease temporarily with stretching/\"cracking\" his low back.   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  .                     Pain is the same all the time.  Since onset symptoms are gradually improving.     Special tests included:  X-ray.  Past treatment: chiro interrmittently, last visit about a month ago.   Restrictions due to condition include:  Currently not working due to present treatment.  Barriers include:  Requires assistance with ADL's and stairs.    Patient Health History           General health as reported by patient is excellent.  Pertinent medical history includes: smoking (umbilical hernia).   Red flags:  None as reported by patient.  Medical allergies: none.   Surgeries include:  Other. Other surgery history details: " hernia August 2020.        Current occupation is  - 60+ hours/week (work restriction 40# from hernia).   Primary job tasks include:  Prolonged standing and lifting/carrying.                                    Objective:  Standing Alignment:        Lumbar:  Lordosis decr            Gait:  Slow/guarded, upright  Difficulty/compensation with sit-stand transfer                   Lumbar/SI Evaluation  ROM:    AROM Lumbar:   Flexion:          Hands to knees/guarded, painful  Ext:                    33%-pain   Side Bend:        Left:     Right:   Rotation:           Left:     Right:   Side Glide:        Left:  WNL    Right:  Min limited          Lumbar Myotomes:  normal            Lumbar DTR's:    L4 (Quad):  Left:  1   Right:  1  S1 (Achilles):  Left:  2   Right:  2    Lumbar Dermtomes:  normal                                                                     Symptoms prior to test movements:  Pain right>left LB, bilateral anterior hips and right thigh   Correction of sitting posture with lumbar roll:  Decrease LBP, abolish left anterior hip   Prone:  6/10 pain right>left LB, right >left anterior hip/groin  EIL:  Increase right LB, worse  Prone over 1 pillow:  Decrease pain   REIL over 1 pillow: (partial ROM), decrease pain, better       Assessment/Plan:    Patient is a 22 year old male with lumbar complaints.    Patient has the following significant findings with corresponding treatment plan.                Diagnosis 1:  LBP with right sciatica     Pain -  hot/cold therapy, self management, education, directional preference exercise and home program  Decreased ROM/flexibility - therapeutic exercise and home program  Inflammation - cold therapy and self management/home program  Decreased function - therapeutic activities and home program  Impaired posture - neuro re-education and home program    Cumulative Therapy Evaluation is: Low complexity.    Previous and current functional limitations:  (See  Goal Flow Sheet for this information)    Short term and Long term goals: (See Goal Flow Sheet for this information)     Communication ability:  Patient appears to be able to clearly communicate and understand verbal and written communication and follow directions correctly.  Treatment Explanation - The following has been discussed with the patient:   RX ordered/plan of care  Anticipated outcomes  Possible risks and side effects  This patient would benefit from PT intervention to resume normal activities.   Rehab potential is good.    Frequency:  2X week, once daily for 1 week, then 1x/week once daily for 4 weeks  Discharge Plan:  Achieve all LTG.  Independent in home treatment program.  Reach maximal therapeutic benefit.    Please refer to the daily flowsheet for treatment today, total treatment time and time spent performing 1:1 timed codes.

## 2023-01-26 ENCOUNTER — THERAPY VISIT (OUTPATIENT)
Dept: PHYSICAL THERAPY | Facility: CLINIC | Age: 23
End: 2023-01-26
Payer: COMMERCIAL

## 2023-01-26 DIAGNOSIS — M54.41 BILATERAL LOW BACK PAIN WITH RIGHT-SIDED SCIATICA: Primary | ICD-10-CM

## 2023-01-26 PROCEDURE — 97110 THERAPEUTIC EXERCISES: CPT | Mod: GP | Performed by: PHYSICAL THERAPIST

## 2023-01-26 PROCEDURE — 97530 THERAPEUTIC ACTIVITIES: CPT | Mod: GP | Performed by: PHYSICAL THERAPIST

## 2023-01-27 ENCOUNTER — OFFICE VISIT (OUTPATIENT)
Dept: FAMILY MEDICINE | Facility: CLINIC | Age: 23
End: 2023-01-27

## 2023-01-27 VITALS
HEART RATE: 70 BPM | TEMPERATURE: 97.6 F | SYSTOLIC BLOOD PRESSURE: 102 MMHG | DIASTOLIC BLOOD PRESSURE: 58 MMHG | WEIGHT: 177 LBS | OXYGEN SATURATION: 98 % | BODY MASS INDEX: 28.57 KG/M2 | RESPIRATION RATE: 18 BRPM

## 2023-01-27 DIAGNOSIS — L65.9 LOSS OF HAIR: ICD-10-CM

## 2023-01-27 DIAGNOSIS — R23.8 SCALP IRRITATION: ICD-10-CM

## 2023-01-27 DIAGNOSIS — M54.41 ACUTE RIGHT-SIDED LOW BACK PAIN WITH RIGHT-SIDED SCIATICA: Primary | ICD-10-CM

## 2023-01-27 PROCEDURE — 99213 OFFICE O/P EST LOW 20 MIN: CPT | Performed by: STUDENT IN AN ORGANIZED HEALTH CARE EDUCATION/TRAINING PROGRAM

## 2023-01-27 NOTE — PATIENT INSTRUCTIONS
We'll fax disability paperwork in today    Continue physical therapy, ask PT about gradually getting back to gym workouts to avoid reinjury    Follow-up as needed if things don't continue to improve

## 2023-01-27 NOTE — NURSING NOTE
Chief Complaint   Patient presents with     Follow Up     Has short term disability forms that need to be done because he has missed more then 3 days of work for his lower back pain, he wants to be able to get some imaging done and be able to go back to work on Monday, feels that lower back has been improving a lot since it first started      Pre-visit Screening:  Immunizations:  up to date  Colonoscopy:  NA  Mammogram: NA  Asthma Action Test/Plan:  NA  PHQ9:  PHQ2 done today   GAD7:  No concerns  Questioned patient about current smoking habits Pt. currently smokes.  Advised about smoking cessation.  Ok to leave detailed message on voice mail for today's visit only Yes, phone # [phone]

## 2023-01-27 NOTE — PROGRESS NOTES
Assessment & Plan       ICD-10-CM    1. Acute right-sided low back pain with right-sided sciatica  M54.41       2. Scalp irritation  R23.8 Adult Dermatology Referral      3. Loss of hair  L65.9 Adult Dermatology Referral         Doing much better than earlier this week, completed paperwork with patient today but ok to return to work next week as planned.     Patient Instructions   We'll fax disability paperwork in today    Continue physical therapy, ask PT about gradually getting back to gym workouts to avoid reinjury    Follow-up as needed if things don't continue to improve       Reasons to follow-up sooner or seek emergent care reviewed.     >20 minutes spent on the date of the encounter doing chart review, history and exam, documentation and further activities per the note    Jan Childress MD, [facility]       Subjective     Aimeekaterine DAMIEN Mario Wright is a 22 year old male who presents to clinic today for the following health issues:    HPI   Chief Complaint   Patient presents with     Follow Up     Has short term disability forms that need to be done because he has missed more then 3 days of work for his lower back pain, want to be able to go back to work on Monday, feels that lower back has been improving a lot since it first started       Back pain significantly improved, 90% better.   Has had two PT appts  Worked out at gym and worked out without any issues  No new sx, no numbness or weakness, no bowel/bladder concerns          Objective    /58 (BP Location: Right arm, Patient Position: Sitting, Cuff Size: Adult Large)   Pulse 70   Temp 97.6  F (36.4  C) (Temporal)   Resp 18   Wt 80.3 kg (177 lb)   SpO2 98%   BMI 28.57 kg/m    Body mass index is 28.57 kg/m .  Alert, NAD  NC/AT  Sclerae anicteric  Regular  Resp nonlabored  Skin warm and dry  No focal neuro deficits. Speech intact.   Appropriate affect  Normal gait.    Imaging reviewed.

## 2023-01-27 NOTE — LETTER
January 27, 2023      Aren Emanuel  [address]  [facility] [address]        To Whom It May Concern:    Aren Emanuel was seen in our clinic. He may return to work without restrictions on 1/30/23.      Sincerely,          Jan Childress MD, [facility]

## 2023-03-16 ENCOUNTER — TRANSFERRED RECORDS (OUTPATIENT)
Dept: FAMILY MEDICINE | Facility: CLINIC | Age: 23
End: 2023-03-16

## 2023-03-28 NOTE — LETTER
"August 30, 2017      Aren Emanuel  [address] [place] [address]        Dear Mr.Al Wright,    We are writing to inform you of your test results.    STD testing was negative.  Fasting glucose was normal - no evidence of diabetes.    Your cholesterol levels were elevated.    Limit the amount of saturated and trans fat that you eat. Saturated fat is found in whole milk, cheese, butter, ice cream, cream, lard, fatty cuts of meat, poultry with the skin on, and some tropical vegetable oils, such as coconut and palm kernel oil. Trans fat may be found in stick margarine, shortening, french fries, cookies, crackers, and bakery goods. If the food label has the words \"partially hydrogenated,\" the product probably has trans fat.   Get more exercise, especially aerobic exercise. Ask your provider to give you a physical activity plan that tells you what kind of activity, and how much, is safe for you. Start slowly to avoid injury. 150 minutes per week is recommended  Lose weight if you are overweight and keep a healthy weight. Even a 10% weight loss may lower your cholesterol, and lower your blood sugar and blood pressure, if they are also too high. A 10% weight loss would be 20 pounds if you weigh 200 pounds.   We should re-check your cholesterol in 1 year.       Resulted Orders   Lipid Profile   Result Value Ref Range    Cholesterol 202 (H) <170 mg/dL    HDL Cholesterol 41 (L) >45 mg/dL    Triglycerides 134 (H) <90 mg/dL    LDL Cholesterol Calculated 135 (H) <110 mg/dL (calc)      Comment:      The Joaquina calculation is a validated novel   method that provides better accuracy than the   Friedewald equation in the estimation of LDL-C,   particularly when TG levels are 150-400 mg/dL and   LDL-C levels are lower than 70 mg/dL.  Reference:  Jignesh EDMOND et al. Comparison of a Novel   Method vs the Friedewald Equation for Estimating   Low-Density Lipoprotein Cholesterol Levels From the   Standard Lipid Profile. CHER. " 2013;310(10): 0465-4593.     For additional information, please refer to  http://education.[employer]/faq/TFZ668  (This link is being provided for informational/  educational purposes only.)      Cholesterol/HDL Ratio 4.9 <5.0 (calc)    Non HDL Cholesterol 161 (H) <120 mg/dL (calc)      Comment:      For patients with diabetes plus 1 major ASCVD risk   factor, treating to a non-HDL-C goal of <100 mg/dL   (LDL-C of <70 mg/dL) is considered a therapeutic   option.     Glucose Fasting (BFP)   Result Value Ref Range    Glucose 79 60 - 99 mg/dL   RPR W/REFLEX TITER & CONFIRM   Result Value Ref Range    RPR Screen NON-REACTIVE NON-REACTIVE   Chlam Trach/N. Gonorrhoeae RNA TMA(Quest   Result Value Ref Range    Chlamydia Trachomatis RNA TMA NOT DETECTED NOT DETECTED    Neisseria Gonorrhoeae RNA TMA NOT DETECTED NOT DETECTED    See Note SEE COMMENT       Comment:      This test was performed using the APTIMA COMBO2 Assay  ([employer]     The analytical performance characteristics of this   assay, when used to test SurePath specimens have  been determined by [facility].         HIV 1/2 Agn Wanda 4th Gen w Reflex (Quest)   Result Value Ref Range    HIV 1/2 Agn Wanda 4th Gen With Reflex NON-REACTIVE NON-REACTIVE      Comment:      HIV-1 antigen and HIV-1/HIV-2 antibodies were not  detected. There is no laboratory evidence of HIV  infection.     PLEASE NOTE: This information has been disclosed to  you from records whose confidentiality may be  protected by state law.  If your state requires such  protection, then the state law prohibits you from  making any further disclosure of the information  without the specific written consent of the person  to whom it pertains, or as otherwise permitted by law.  A general authorization for the release of medical or  other information is NOT sufficient for this purpose.      For additional information please refer  to  http://education.[employer]/faq/RJL669  (This link is being provided for informational/  educational purposes only.)        The performance of this assay has not been clinically  validated in patients less than 2 years old.            If you have any questions or concerns, please call the clinic at the number listed above.       Sincerely,        FACUNDO Bell                 -2 -3

## 2023-04-22 ENCOUNTER — HEALTH MAINTENANCE LETTER (OUTPATIENT)
Age: 23
End: 2023-04-22

## 2023-04-25 ENCOUNTER — HOSPITAL ENCOUNTER (OUTPATIENT)
Facility: CLINIC | Age: 23
End: 2023-04-25
Attending: SURGERY | Admitting: SURGERY
Payer: COMMERCIAL

## 2023-04-28 ENCOUNTER — TELEPHONE (OUTPATIENT)
Dept: SURGERY | Facility: CLINIC | Age: 23
End: 2023-04-28
Payer: COMMERCIAL

## 2023-04-28 NOTE — TELEPHONE ENCOUNTER
Type of surgery: Open umbilical hernia repair with mesh  Location of surgery: [facility]  Date and time of surgery: 7/19/23 at 1:30pm  Surgeon: Dr. Alisha Montero  Pre-Op Appt Date: patient to schedule  Post-Op Appt Date: patient to schedule   Packet sent out: Yes  Pre-cert/Authorization completed:  Not Applicable  Date: 4/28/23

## 2023-05-08 NOTE — PROGRESS NOTES
Discharge Note    Progress reporting period is from 1-24-23 to Jan 26, 2023.      Aren failed to follow up and current status is unknown.  Please see information below for last relevant information on current status.  Patient seen for 2 visits.    SUBJECTIVE  At second visit reported intensity of pain overall decreaseed, ranges 4-5/10.  Still pain with each step on the right, not as intense.   Partially compliant with HEP.  Current pain level is 4/10 (right>left LB and right buttock and min bilateral anterior hips).     Previous pain level was   (6-8/10).   Changes in function:  Yes (See Goal flowsheet attached for changes in current functional level)  Adverse reaction to treatment or activity: None    OBJECTIVE  Slight trunk shift left in standing.  Lumbar extension 10% with right LBP.     ASSESSMENT/PLAN  Diagnosis: LBP with right sciatica   Patient did not return for further visits so problems/goal status is unknown.    Recommendations:  Patient did not return for follow-up visits as expected.  Will discharge physical therapy chart at this time.'    Please refer to the daily flowsheet for treatment today, total treatment time and time spent performing 1:1 timed codes.

## 2023-07-12 ENCOUNTER — OFFICE VISIT (OUTPATIENT)
Dept: FAMILY MEDICINE | Facility: CLINIC | Age: 23
End: 2023-07-12

## 2023-07-12 VITALS
WEIGHT: 179 LBS | HEART RATE: 61 BPM | DIASTOLIC BLOOD PRESSURE: 68 MMHG | OXYGEN SATURATION: 98 % | HEIGHT: 66 IN | SYSTOLIC BLOOD PRESSURE: 112 MMHG | TEMPERATURE: 97.4 F | BODY MASS INDEX: 28.77 KG/M2

## 2023-07-12 DIAGNOSIS — R07.0 THROAT PAIN: Primary | ICD-10-CM

## 2023-07-12 LAB — STREP A: NEGATIVE

## 2023-07-12 PROCEDURE — 99213 OFFICE O/P EST LOW 20 MIN: CPT | Performed by: PHYSICIAN ASSISTANT

## 2023-07-12 PROCEDURE — 87651 STREP A DNA AMP PROBE: CPT | Performed by: PHYSICIAN ASSISTANT

## 2023-07-12 NOTE — NURSING NOTE
Chief Complaint   Patient presents with     Throat Problem     Feels like something is stuck in his throat, feels like a lump  Pt had his tonsils removed 2 years ago, the scar is appearing white today  Has appt for eval of esophageal hernia for acid reflux  Would like a referral for ENT         Pre-visit Screening:  Immunizations:  up to date  Colonoscopy:  NA  Mammogram: NA  Asthma Action Test/Plan:  NA  PHQ9:  NA  GAD7:  NA  Questioned patient about current smoking habits Pt. recently quit smoking but smokes cannabis.  Ok to leave detailed message on voice mail for today's visit only Yes, phone # [phone]

## 2023-07-12 NOTE — PROGRESS NOTES
"  Assessment & Plan     Throat pain-unclear origin, suspect possible GERD (pt is being worked up for this next month) vs ENT issue. Given that he needs to schedule ENT surgery and has history with ENT, will refer to ENT for further management and consult  Trial Flonase for 1 month to see if this improves symptoms  - Strep A (BFP)  - Adult ENT  Referral - To a [facility] Location (Use POS/Location)    Follow up with ENT, otherwise as needed    No follow-ups on file.    Mark Zhao, ABBIE  [facility]    Subjective   Aren is a 23 year old, presenting for the following health issues:  Throat Problem (Feels like something is stuck in his throat, feels like a lump/Pt had his tonsils removed 2 years ago, the scar is appearing white today/Has appt for eval of esophageal hernia for acid reflux/Would like a referral for ENT)    HPI   - First noticed a lump in his throat 2-3 weeks ago. Does not know if it is getting better or worse. Sometimes notices jaw pain and ear pain also. Denies pain in the throat but just feels like it is bothersome. No other sick symptoms. Had a tonsillectomy a few years ago. Does not feel like strep throat like he usually feels, but has not had strep since having his tonsils out so does not know exactly how that feels. Denies fatigue and body aches. Patient also has a lot of nose bleeds - nearly daily. Was told to get nasal surgery for deviated septum, but hasn't completed this yet.    Patient is a smoker.     Has noticed increased reflux lateley which he has never had before. Getting this worked up next month.    Review of Systems   Constitutional, HEENT, cardiovascular, pulmonary, gi and gu systems are negative, except as otherwise noted.      Objective    /68 (BP Location: Left arm, Patient Position: Sitting, Cuff Size: Adult Large)   Pulse 61   Temp 97.4  F (36.3  C) (Temporal)   Ht 1.676 m (5' 6\")   Wt 81.2 kg (179 lb)   SpO2 98%   BMI 28.89 kg/m  "   Body mass index is 28.89 kg/m .  Physical Exam   GENERAL: healthy, alert and no distress  HENT: normal cephalic/atraumatic, ear canals and TM's normal, nose and mouth without ulcers or lesions, oral mucous membranes moist and small white area noted on L tonsillar pillar. Some cobblestoning noted in oropharynx  RESP: lungs clear to auscultation - no rales, rhonchi or wheezes  CV: regular rate and rhythm, normal S1 S2, no S3 or S4, no murmur, click or rub, no peripheral edema and peripheral pulses strong  ABDOMEN: soft, nontender, no hepatosplenomegaly, no masses and bowel sounds normal  MS: no gross musculoskeletal defects noted, no edema  SKIN: no suspicious lesions or rashes  NEURO: Normal strength and tone, mentation intact and speech normal    Results for orders placed or performed in visit on 07/12/23 (from the past 24 hour(s))   Strep A (BFP)   Result Value Ref Range    STREP A Negative Negative

## 2023-08-21 ENCOUNTER — OFFICE VISIT (OUTPATIENT)
Dept: SURGERY | Facility: CLINIC | Age: 23
End: 2023-08-21
Payer: COMMERCIAL

## 2023-08-21 VITALS
DIASTOLIC BLOOD PRESSURE: 70 MMHG | HEIGHT: 65 IN | RESPIRATION RATE: 16 BRPM | BODY MASS INDEX: 30.82 KG/M2 | HEART RATE: 64 BPM | WEIGHT: 185 LBS | SYSTOLIC BLOOD PRESSURE: 110 MMHG | OXYGEN SATURATION: 99 %

## 2023-08-21 DIAGNOSIS — K44.9 HIATAL HERNIA: ICD-10-CM

## 2023-08-21 DIAGNOSIS — K42.9 UMBILICAL HERNIA WITHOUT OBSTRUCTION AND WITHOUT GANGRENE: ICD-10-CM

## 2023-08-21 DIAGNOSIS — K43.9 EPIGASTRIC HERNIA: Primary | ICD-10-CM

## 2023-08-21 PROCEDURE — 99214 OFFICE O/P EST MOD 30 MIN: CPT | Performed by: SURGERY

## 2023-08-22 ENCOUNTER — HOSPITAL ENCOUNTER (OUTPATIENT)
Dept: CT IMAGING | Facility: CLINIC | Age: 23
Discharge: HOME | End: 2023-08-22
Attending: SURGERY | Admitting: SURGERY
Payer: COMMERCIAL

## 2023-08-22 DIAGNOSIS — K43.9 EPIGASTRIC HERNIA: ICD-10-CM

## 2023-08-22 DIAGNOSIS — K44.9 HIATAL HERNIA: ICD-10-CM

## 2023-08-22 PROCEDURE — 71250 CT THORAX DX C-: CPT

## 2023-08-23 ENCOUNTER — TELEPHONE (OUTPATIENT)
Dept: SURGERY | Facility: CLINIC | Age: 23
End: 2023-08-23
Payer: COMMERCIAL

## 2023-08-23 NOTE — TELEPHONE ENCOUNTER
Patient calling.    Patient was seen for Consult with Dr Andrade on 08/21.  Dr Andrade ordered a CT scan and patient completed the scan yesterday.    Patient received a [employer] message with the CT results and is wondering what Dr Andrade's recommendation is.      Please review and advise.    Patient can be reached at [phone].

## 2023-08-24 NOTE — PROGRESS NOTES
[facility]  Surgery Consultation    CONSULTATION REQUESTED BY:  Jan Childress [phone]    HPI: This patient is a 23-year-old gentleman with a prior surgical history of laparoscopic bilateral inguinal hernia repair and umbilical hernia repair in 2020 by different surgeon.  He presents now for evaluation due to concerns of recurrence of hernia in the umbilicus as well as potential issues that he may feel are related to a possible hiatal hernia.  He reports that he took a long time to recover after his surgery.  Was not until about 9 months postop that he felt that he was back to a reasonable level of activity.  By 16 months postop he states that he had initiated a new workout regiment and increase the intensity of this over time and during this he began to experience periumbilical abdominal pain.  He is also had some left upper quadrant abdominal pain and occasionally reports tightness and discomfort.  After large meals he has belching and hiccups.  He also describes some manner of a weird sensation in his throat.  No nausea or vomiting.  No dyspepsia.  No dysphagia or odynophagia.    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  PSH:    has a past surgical history that includes XR Wrist Surgery JAMES Right (2017); tonsillectomy; Cystoscopy; Laparoscopic herniorrhaphy inguinal bilateral (Bilateral, 08/26/2020); and Herniorrhaphy umbilical (N/A, 08/26/2020).  Social History:   reports that he has quit smoking. His smoking use included cigarettes. He has never been exposed to tobacco smoke. He uses smokeless tobacco. He reports current alcohol use. He reports current drug use. Drugs: Marijuana and Amphetamines.  Family History:  family history includes  "Anxiety Disorder in his sister; Bipolar Disorder in his mother; Depression in his brother, mother, and sister; Hyperlipidemia in his father; Other - See Comments in his brother; Seizure Disorder in his sister.  Medications/Allergies: Home medications and allergies reviewed.    ROS:  The 10 point Review of Systems is negative other than noted in the HPI.    Physical Exam:  /70   Pulse 64   Resp 16   Ht 1.651 m (5' 5\")   Wt 83.9 kg (185 lb)   SpO2 99%   BMI 30.79 kg/m    GENERAL: Generally appears well.  Psych: Alert and Oriented.  Normal affect  Eyes: Sclera clear  Respiratory:  Lungs clear to ausculation bilaterally with good air excursion  Cardiovascular:  Regular Rate and Rhythm with no murmurs gallops or rubs, normal peripheral pulses  GI: Abdomen Non Distended Soft Mild tenderness to palpation periumbilical Umbilical hernia palpated..  Groin- I examined the patient in both the standing and supine positions. Right Groin- No hernia Palpated. Left Groin- No hernia Palpated. No scrotal or testicle abnormalities.  Lymphatic/Hematologic/Immune:  No femoral or cervical lymphadenopathy.  Integumentary:  No rashes  Neurological: grossly intact     All new lab and imaging data was reviewed.     Impression and Plan:  Patient is a 23 year old male with recurrent umbilical hernia and left upper quadrant abdominal pain of unclear etiology.    PLAN: I discussed with him the evaluation and management/work-up of the patient's for possible hiatal hernia.  At present prior to proceeding with anything related to the umbilical hernia I am going to send him for a CT scan of the chest abdomen pelvis.  Reasoning behind this was reviewed.  His questions were answered.  Further recommendations likely to follow-up after reviewing his imaging.  I discussed the pathophysiology of hernias and options for repair including laparoscopic VS open.  The risks associated with the procedure including, but not limited to, recurrence, " nerve entrapment or injury, persistence of pain, injury to the bowel/bladder, infertility, hematoma, mesh migration, mesh infection, MI, and PE were discussed with the patient. He indicated understanding of the discussion, asked appropriate questions, and provided consent. Signs and symptoms of incarceration were discussed. If these develop in the interim, he promises to call or go straight to the ER. I have provided the patient with an information pamphlet.      Thank you very much for this consult.    Rudi Andrade M.D.  [facility]  [phone]    Please route or send letter to:  Primary Care Provider (PCP) and Referring Provider

## 2023-08-25 NOTE — RESULT ENCOUNTER NOTE
Result reviewed, images also personally reviewed.  Patient does not have evidence of hiatal hernia.  There is a small recurrent umbilical hernia.  Some thickening of the ascending colon which could represent colitis.  Okay for patient to consider or schedule recurrent umbilical hernia repair with mesh at his convenience.  Refer also to GI service to evaluate for possible colitis infectious versus inflammatory.

## 2023-08-28 ENCOUNTER — TRANSFERRED RECORDS (OUTPATIENT)
Dept: FAMILY MEDICINE | Facility: CLINIC | Age: 23
End: 2023-08-28

## 2023-08-28 NOTE — TELEPHONE ENCOUNTER
Closing encounter.  Results were reviewed by Dr Andrade on 08/25 and patient was contacted by RN with results and recommendation.

## 2023-09-05 ENCOUNTER — TRANSFERRED RECORDS (OUTPATIENT)
Dept: FAMILY MEDICINE | Facility: CLINIC | Age: 23
End: 2023-09-05

## 2023-09-12 ENCOUNTER — TRANSFERRED RECORDS (OUTPATIENT)
Dept: FAMILY MEDICINE | Facility: CLINIC | Age: 23
End: 2023-09-12

## 2023-10-10 ENCOUNTER — OFFICE VISIT (OUTPATIENT)
Dept: FAMILY MEDICINE | Facility: CLINIC | Age: 23
End: 2023-10-10

## 2023-10-10 VITALS
WEIGHT: 187 LBS | DIASTOLIC BLOOD PRESSURE: 78 MMHG | OXYGEN SATURATION: 97 % | SYSTOLIC BLOOD PRESSURE: 120 MMHG | BODY MASS INDEX: 31.12 KG/M2 | TEMPERATURE: 98 F | RESPIRATION RATE: 20 BRPM | HEART RATE: 50 BPM

## 2023-10-10 DIAGNOSIS — M76.899 QUADRICEPS TENDINITIS: ICD-10-CM

## 2023-10-10 DIAGNOSIS — R53.83 OTHER FATIGUE: ICD-10-CM

## 2023-10-10 DIAGNOSIS — M79.10 MYALGIA: Primary | ICD-10-CM

## 2023-10-10 LAB
% GRANULOCYTES: 56.5 %
ALBUMIN SERPL-MCNC: 4.8 G/DL (ref 3.6–5.1)
ALBUMIN/GLOB SERPL: 1.9 {RATIO} (ref 1–2.5)
ALP SERPL-CCNC: 72 U/L (ref 33–130)
ALT 1742-6: 35 U/L (ref 0–32)
AST 1920-8: 27 U/L (ref 0–35)
BILIRUB SERPL-MCNC: 0.5 MG/DL (ref 0.2–1.2)
BUN SERPL-MCNC: 28 MG/DL (ref 7–25)
BUN/CREATININE RATIO: 30.1 (ref 6–32)
CALCIUM SERPL-MCNC: 9.1 MG/DL (ref 8.6–10.3)
CHLORIDE SERPLBLD-SCNC: 104.3 MMOL/L (ref 98–110)
CO2 SERPL-SCNC: 24.5 MMOL/L (ref 20–32)
CREAT SERPL-MCNC: 0.93 MG/DL (ref 0.6–1.3)
ERYTHROCYTE [SEDIMENTATION RATE] IN BLOOD: 4 MM/HR (ref 0–20)
GLOBULIN, CALCULATED - QUEST: 2.5 (ref 1.9–3.7)
GLUCOSE SERPL-MCNC: 85 MG/DL (ref 60–99)
HCT VFR BLD AUTO: 47.3 % (ref 40–53)
HEMOGLOBIN: 15.3 G/DL (ref 13.3–17.7)
LYMPHOCYTES NFR BLD AUTO: 34.6 %
MCH RBC QN AUTO: 29.6 PG (ref 26–33)
MCHC RBC AUTO-ENTMCNC: 32.3 G/DL (ref 31–36)
MCV RBC AUTO: 91.5 FL (ref 78–100)
MONOCYTES NFR BLD AUTO: 8.9 %
PLATELET COUNT - QUEST: 155 10^9/L (ref 150–375)
POTASSIUM SERPL-SCNC: 4.59 MMOL/L (ref 3.5–5.3)
PROT SERPL-MCNC: 7.3 G/DL (ref 6.1–8.1)
RBC # BLD AUTO: 5.17 10*12/L (ref 4.4–5.9)
SODIUM SERPL-SCNC: 139.5 MMOL/L (ref 135–146)
WBC # BLD AUTO: 7.2 10*9/L (ref 4–11)

## 2023-10-10 PROCEDURE — 84443 ASSAY THYROID STIM HORMONE: CPT | Mod: 90 | Performed by: STUDENT IN AN ORGANIZED HEALTH CARE EDUCATION/TRAINING PROGRAM

## 2023-10-10 PROCEDURE — 36415 COLL VENOUS BLD VENIPUNCTURE: CPT | Performed by: STUDENT IN AN ORGANIZED HEALTH CARE EDUCATION/TRAINING PROGRAM

## 2023-10-10 PROCEDURE — 85651 RBC SED RATE NONAUTOMATED: CPT | Performed by: STUDENT IN AN ORGANIZED HEALTH CARE EDUCATION/TRAINING PROGRAM

## 2023-10-10 PROCEDURE — 99214 OFFICE O/P EST MOD 30 MIN: CPT | Performed by: STUDENT IN AN ORGANIZED HEALTH CARE EDUCATION/TRAINING PROGRAM

## 2023-10-10 PROCEDURE — 85025 COMPLETE CBC W/AUTO DIFF WBC: CPT | Performed by: STUDENT IN AN ORGANIZED HEALTH CARE EDUCATION/TRAINING PROGRAM

## 2023-10-10 PROCEDURE — 80053 COMPREHEN METABOLIC PANEL: CPT | Performed by: STUDENT IN AN ORGANIZED HEALTH CARE EDUCATION/TRAINING PROGRAM

## 2023-10-10 PROCEDURE — 82550 ASSAY OF CK (CPK): CPT | Mod: 90 | Performed by: STUDENT IN AN ORGANIZED HEALTH CARE EDUCATION/TRAINING PROGRAM

## 2023-10-10 PROCEDURE — 82728 ASSAY OF FERRITIN: CPT | Mod: 90 | Performed by: STUDENT IN AN ORGANIZED HEALTH CARE EDUCATION/TRAINING PROGRAM

## 2023-10-10 NOTE — PROGRESS NOTES
Assessment & Plan       ICD-10-CM    1. Myalgia  M79.10 CK TOTAL ([facility])     ESR WESTERGREN ([facility])     Comprehensive Metobolic Panel ([facility])      2. Other fatigue  R53.83 HEMOGRAM PLATELET DIFF ([facility])     FERRITIN ([facility])     TSH WITH FREE T4 REFLEX ([facility])      3. Quadriceps tendinitis  M76.899 Physical Therapy Referral         Fatigue and muscle soreness likely overtraining/underfueling. Labs as above, has decreased workouts significantly and encouraged to continue max 2-3x/wk for now. Increase caloric intake.    R knee pain most c/w quad tendinitis, reviewed nature and tx options, agreed to start PT    Patient Instructions   Blood work today    Work on your knee and lifting mechanics with physical therapy at [facility]    Follow-up in 2 months if not improving     Reasons to follow-up sooner or seek emergent care reviewed.       Jan Childress MD, [facility]       Subjective     Aren Emanuel is a 23 year old male who presents to clinic today for the following health issues:    HPI   Chief Complaint   Patient presents with    Consult For     Has been having muscle weakness and pain and feels this is from overdoing it when working out, he feels that he is overusing his muscles, has tried to take time off from working out after some right knee/leg soreness, has been doing a workload and caloric reduction and wondering if this has something to do with sx      Was working out 2x/day, 3-4+ hrs per day, started cutting calories in June around time of sx onset  Now working out 3d/wk  No CP/palpitations/LH/presyncope or cardiac fam hx    Family History   Problem Relation Age of Onset    Depression Mother     Bipolar Disorder Mother     Hyperlipidemia Father     Seizure Disorder Sister     Depression Brother         estranged    Other - See Comments Brother         PTDS    Depression Sister     Anxiety Disorder Sister      Also with R knee pain for several weeks. No acute injury. Increasing sore with  lifting. No swelling or redness, no instability.         Objective    /78 (BP Location: Left arm, Patient Position: Sitting, Cuff Size: Adult Large)   Pulse 50   Temp 98  F (36.7  C) (Temporal)   Resp 20   Wt 84.8 kg (187 lb)   SpO2 97%   BMI 31.12 kg/m    Body mass index is 31.12 kg/m .  Alert, NAD  NC/AT  Sclerae anicteric  Regular  Resp nonlabored  Skin warm and dry, no rash  No focal neuro deficits. Speech intact.   Appropriate affect  TTP at distal quad tendon, no defect, ext mech intact, no knee effusion  Normal gait.    Labs reviewed.

## 2023-10-10 NOTE — NURSING NOTE
Chief Complaint   Patient presents with    Consult For     Has been having muscle weakness and pain and feels this is from overdoing it when working out, he feels that he is overusing his muscles, has tried to take time off from working out, does not feel the pain while lifting is happening but does hurt when going up stairs, wants to talk about over training and get some advice from Dr. Childress on this, has been doing a caloric reduction and wondering if this has something to do with sx's, was taking in over 4000 calories per day before cutting back      Pre-visit Screening:  Immunizations:  up to date  Colonoscopy:  na  Mammogram: na  Asthma Action Test/Plan:  na  PHQ9:  na  GAD7:  na  Questioned patient about current smoking habits Pt. quit smoking some time ago.  Ok to leave detailed message on voice mail for today's visit only yes, phone # [phone] (home)

## 2023-10-10 NOTE — PATIENT INSTRUCTIONS
Blood work today    Work on your knee and lifting mechanics with physical therapy at [facility]    Follow-up in 2 months if not improving

## 2023-10-11 LAB
CK SERPL-CCNC: 382 U/L (ref 44–196)
FERRITIN SERPL-MCNC: 13 NG/ML (ref 38–380)
TSH SERPL-ACNC: 3.66 MIU/L (ref 0.4–4.5)

## 2024-01-29 ENCOUNTER — OFFICE VISIT (OUTPATIENT)
Dept: FAMILY MEDICINE | Facility: CLINIC | Age: 24
End: 2024-01-29

## 2024-01-29 VITALS
BODY MASS INDEX: 30.95 KG/M2 | DIASTOLIC BLOOD PRESSURE: 72 MMHG | SYSTOLIC BLOOD PRESSURE: 104 MMHG | HEART RATE: 75 BPM | RESPIRATION RATE: 20 BRPM | TEMPERATURE: 98 F | OXYGEN SATURATION: 97 % | WEIGHT: 186 LBS

## 2024-01-29 DIAGNOSIS — M25.512 CHRONIC LEFT SHOULDER PAIN: Primary | ICD-10-CM

## 2024-01-29 DIAGNOSIS — G89.29 CHRONIC LEFT SHOULDER PAIN: Primary | ICD-10-CM

## 2024-01-29 PROCEDURE — G2211 COMPLEX E/M VISIT ADD ON: HCPCS | Performed by: STUDENT IN AN ORGANIZED HEALTH CARE EDUCATION/TRAINING PROGRAM

## 2024-01-29 PROCEDURE — 73030 X-RAY EXAM OF SHOULDER: CPT | Mod: LT | Performed by: STUDENT IN AN ORGANIZED HEALTH CARE EDUCATION/TRAINING PROGRAM

## 2024-01-29 PROCEDURE — 99214 OFFICE O/P EST MOD 30 MIN: CPT | Performed by: STUDENT IN AN ORGANIZED HEALTH CARE EDUCATION/TRAINING PROGRAM

## 2024-01-29 RX ORDER — MELOXICAM 15 MG/1
15 TABLET ORAL DAILY PRN
Qty: 30 TABLET | Refills: 0 | Status: SHIPPED | OUTPATIENT
Start: 2024-01-29 | End: 2024-03-29

## 2024-01-29 NOTE — NURSING NOTE
Chief Complaint   Patient presents with    Consult For     Left shoulder pain has been around for the past 3 or more months now, feels that he injured it doing work outs, does have a lump in the area of his shoulder that hurts, pain has not gone away so he feels he tore a muscle or tendon, does make a noise in his arm when he moves it a certain way      Pre-visit Screening:  Immunizations:  up to date  Colonoscopy:  na  Mammogram: na  Asthma Action Test/Plan:  na  PHQ9:  na  GAD7:  na  Questioned patient about current smoking habits Pt. quit smoking some time ago.  Ok to leave detailed message on voice mail for today's visit only yes, phone # [phone] (home)

## 2024-01-29 NOTE — PATIENT INSTRUCTIONS
Meloxicam instead of ibuprofen    Physical Therapy  [facility]   [address] [place] [id number]  [phone] -- appt line    Separate appt for your back anytime

## 2024-01-29 NOTE — PROGRESS NOTES
"ASSESSMENT & PLAN      ICD-10-CM    1. Chronic left shoulder pain  M25.512 XR Shoulder Left G/E 3 Views    G89.29 Physical Therapy Referral     DISCONTINUED: meloxicam (MOBIC) 15 MG tablet     CANCELED: Physical Therapy Referral         3 months of L shoulder pain  XRs obtained and reviewed with patient.   Hx and exam most c/w labral injury.   Reviewed options for treatment and/or further eval, agreed on plan below, r/b/a nsaid reviewed   If not improving with PT would consider MRI   He will follow-up for his low back pain at his convenience    Patient Instructions   Meloxicam instead of ibuprofen    Physical Therapy  [facility]   [address] [place] [id number]  [phone] -- appt line    Separate appt for your back anytime       >30 minutes spent on the date of the encounter doing chart review, history and exam, documentation and further activities per the note.    Jan Childress MD, [facility]      -----    SUBJECTIVE  Aimeekaterine DAMIEN Martin Adriana is a/an 23 year old male who is seen for evaluation of     Chief Complaint   Patient presents with    Consult For     Left shoulder pain has been around for the past 3 or more months now, feels that he injured it doing work outs, does have a lump in the area of his shoulder that hurts, pain has not gone away so he feels he tore a muscle or tendon, does make a noise in his arm when he moves it a certain way      The patient is seen by themselves.  The patient is Right handed    Date of Onset: 3 months  Mechanism of injury: occurred during benchpress in eccentric phase let weight fall more than usual, left shoulder felt \"heavy\" - no pain until next day began to get sore  Location of Pain: anterosuperior shoulder  Worsened by: lifting, repetitive movements (jasbir shoulder height and above)  Better with: Took 3 weeks off from lifting  Treatments tried: rest/activity avoidance, ibuprofen  Associated symptoms: no distal numbness or " tingling; denies swelling or warmth    Orthopedic/Surgical history: hx of posterior L shoulder pain in 2022 that resolved on its own, different pain per pt  Social History/Occupation: works in factory    Patient's PMH, PSH, and family hx reviewed.      OBJECTIVE:  /72 (BP Location: Left arm, Patient Position: Sitting, Cuff Size: Adult Large)   Pulse 75   Temp 98  F (36.7  C) (Temporal)   Resp 20   Wt 84.4 kg (186 lb)   SpO2 97%   BMI 30.95 kg/m     Alert, NAD  NC/AT  Sclerae anicteric  Regular  Resp nonlabored  Skin warm and dry  No focal neuro deficits. Speech intact.    Appropriate affect      RADIOLOGY:  Results for orders placed or performed in visit on 01/29/24   XR Shoulder Left G/E 3 Views     Status: None    Narrative    Radiologist Consultation/:   Fax:  [phone]  [phone]  _____________________________________________________________________________________________________________________________________________________________________________________________________________________________________________________________________________________________________________________________________________________________________________________________________________________________________________________________________________________________________  PATIENT NAME: WINTER DAMICO  YOB: 2000 Age: 23 ACCESSION NUMBER: [id number]  SEX: M ORDERING PROVIDER: Jan Childress  FACILITY: [facility] PRIMARY PROVIDER:  PATIENT ID: [id number] INTERESTED PARTY:  Page 1 of  1  _________________________________________________________________________________________________________________________________________________________________________________________________________________________________________________________________________________________________________________________________________________________________________________________  EXAM: XRAY SHOULDER,2+VWS LEFT  LOCATION: [facility]  DATE: 1/29/2024  INDICATION: Left shoulder pain  COMPARISON: 10/17/2022  IMPRESSION: Normal joint spaces and alignment. No fracture.  SIGNED BY: Wilman Hudson MD 1/29/2024 8:30 PM

## 2024-02-06 ENCOUNTER — OFFICE VISIT (OUTPATIENT)
Dept: FAMILY MEDICINE | Facility: CLINIC | Age: 24
End: 2024-02-06

## 2024-02-06 VITALS
OXYGEN SATURATION: 97 % | TEMPERATURE: 98 F | HEART RATE: 66 BPM | WEIGHT: 186 LBS | SYSTOLIC BLOOD PRESSURE: 112 MMHG | BODY MASS INDEX: 30.95 KG/M2 | DIASTOLIC BLOOD PRESSURE: 62 MMHG | RESPIRATION RATE: 20 BRPM

## 2024-02-06 DIAGNOSIS — G89.29 CHRONIC BILATERAL LOW BACK PAIN WITH BILATERAL SCIATICA: Primary | ICD-10-CM

## 2024-02-06 DIAGNOSIS — G89.29 CHRONIC LEFT SHOULDER PAIN: ICD-10-CM

## 2024-02-06 DIAGNOSIS — M54.41 CHRONIC BILATERAL LOW BACK PAIN WITH BILATERAL SCIATICA: Primary | ICD-10-CM

## 2024-02-06 DIAGNOSIS — M25.512 CHRONIC LEFT SHOULDER PAIN: ICD-10-CM

## 2024-02-06 DIAGNOSIS — M54.42 CHRONIC BILATERAL LOW BACK PAIN WITH BILATERAL SCIATICA: Primary | ICD-10-CM

## 2024-02-06 PROCEDURE — 99215 OFFICE O/P EST HI 40 MIN: CPT | Performed by: STUDENT IN AN ORGANIZED HEALTH CARE EDUCATION/TRAINING PROGRAM

## 2024-02-06 NOTE — PATIENT INSTRUCTIONS
Ok to continue meloxicam for shoulder and back    PT referral for your back to [facility]    MRI of your low back at [facility]  [phone] -- appt line    Follow-up with me after MRI

## 2024-02-06 NOTE — PROGRESS NOTES
ASSESSMENT & PLAN      ICD-10-CM    1. Chronic bilateral low back pain with bilateral sciatica  M54.42 MR Lumbar Spine w/o Contrast    M54.41 Radiology Referral    G89.29 Physical Therapy Referral      2. Chronic left shoulder pain  M25.512     G89.29          Ongoing low back pain for > 1 year now with bilateral radicular sx  Suspect central disc herniation vs spondylolisthesis, MRI to further eval  OK to continue PT and sx mgmt prn in meantime  Also reviewed shoulder progress, continuing PT for that as well.    Patient Instructions   Ok to continue meloxicam for shoulder and back    PT referral for your back to [facility]    MRI of your low back at [facility]  [phone] -- appt line    Follow-up with me after MRI    >40 minutes spent on the date of the encounter doing chart review, history and exam, documentation and further activities per the note.    Jan Childress MD, [facility]  Primary Care Sports Medicine   -----    SUBJECTIVE:  Aren Emanuel is a 23 year old male who is seen in follow-up for   Chief Complaint   Patient presents with    Consult For     LBP  pain started in lower back area  wants to have whole back checked out through MRI and does not want to do xrays today since he has already had them done  wants to be referred to spine specialist to review this more         Last seen for LBP 1/27/23, a few days after initial lumbar eval at which time mobiq and PT prescribed. Went to 2 PT appts and reported 90% improvement at the time, stopped PT due to costs.     Recalls having several months of no issues, being able to work out regularly.     Approx 6 mos ago had recurrent low back pain while he was progressing the weight he was lifting at the gym. No acute injury. Now having L>R radicular pain. Occasional left dorsal foot numbness. No focal weakness, bowel/bladder changes.     Since their last visit reports 0% - (About the same as last time).     They have tried activity  modification, PT, NSAIDs.      The patient is seen by themselves.    Patient's past medical, surgical, social, and family histories were reviewed today and no changes are noted.      OBJECTIVE:  /62 (BP Location: Right arm, Patient Position: Sitting, Cuff Size: Adult Large)   Pulse 66   Temp 98  F (36.7  C) (Temporal)   Resp 20   Wt 84.4 kg (186 lb)   SpO2 97%   BMI 30.95 kg/m     Alert, NAD  NC/AT  Sclerae anicteric  Regular  Resp nonlabored  Skin warm and dry  Speech intact.    Appropriate affect, anxious    R parasp and SI jt ttp, no midline  5/5 str BLE  SILT BLE  +slump bilat  Normal gait    Imaging:  EXAM: XRAY LUMBAR SPINE-2 OR 3 VIEW  LOCATION: [facility]  DATE/TIME: 1/23/2023 1:23 PM  INDICATION: Acute low back pain, right radicular pain.  COMPARISON: None.  IMPRESSION: No fracture. Normal vertebral heights and alignment. Normal disc spaces and facets for age.  Normal extraspinal structures. There is slight levoscoliosis of the upper lumbar spine.  SIGNED BY: Jenny Benavides MD 1/23/2023 8:05 PM

## 2024-02-06 NOTE — NURSING NOTE
Chief Complaint   Patient presents with    Consult For     Was told by family members that he should get checked for DDD, does lift a lot of weights and feels that he should not have herniated discs caused by this, pain started in lower back area but is also having neck pain that radiates down into his lower back, wants to have whole back checked out through MRI and does not want to do xrays today since he has already had them done, wants to be referred to spine specialist to review this more      Pre-visit Screening:  Immunizations:  up to date  Colonoscopy:  na  Mammogram: na  Asthma Action Test/Plan:  na  PHQ9:  phq2 done today   GAD7:  na  Questioned patient about current smoking habits Pt. quit smoking some time ago.  Ok to leave detailed message on voice mail for today's visit only yes, phone # [phone] (home)

## 2024-02-12 ENCOUNTER — TRANSFERRED RECORDS (OUTPATIENT)
Dept: FAMILY MEDICINE | Facility: CLINIC | Age: 24
End: 2024-02-12

## 2024-02-16 ENCOUNTER — OFFICE VISIT (OUTPATIENT)
Dept: FAMILY MEDICINE | Facility: CLINIC | Age: 24
End: 2024-02-16

## 2024-02-16 VITALS
WEIGHT: 184 LBS | HEART RATE: 68 BPM | SYSTOLIC BLOOD PRESSURE: 116 MMHG | TEMPERATURE: 98 F | BODY MASS INDEX: 30.62 KG/M2 | DIASTOLIC BLOOD PRESSURE: 72 MMHG | OXYGEN SATURATION: 97 %

## 2024-02-16 DIAGNOSIS — G89.29 CHRONIC BILATERAL LOW BACK PAIN WITH BILATERAL SCIATICA: ICD-10-CM

## 2024-02-16 DIAGNOSIS — M54.2 NECK PAIN: Primary | ICD-10-CM

## 2024-02-16 DIAGNOSIS — M54.42 CHRONIC BILATERAL LOW BACK PAIN WITH BILATERAL SCIATICA: ICD-10-CM

## 2024-02-16 DIAGNOSIS — M54.41 CHRONIC BILATERAL LOW BACK PAIN WITH BILATERAL SCIATICA: ICD-10-CM

## 2024-02-16 DIAGNOSIS — G43.909 MIGRAINE WITHOUT STATUS MIGRAINOSUS, NOT INTRACTABLE, UNSPECIFIED MIGRAINE TYPE: ICD-10-CM

## 2024-02-16 PROCEDURE — 72040 X-RAY EXAM NECK SPINE 2-3 VW: CPT

## 2024-02-16 PROCEDURE — 99213 OFFICE O/P EST LOW 20 MIN: CPT

## 2024-02-16 NOTE — PROGRESS NOTES
Assessment & Plan     1. Neck pain  - Suspect muscle strain of neck as most likely diagnosis given patient's symptoms and physical exam. X-ray of cervical spine does not show any fractures or concerning findings, however will await for final read by radiologist and update patient with any concerning findings. Patient was prescribed Mobic 15 mg PRN for his left shoulder pain, recommend he take this daily with food for the next two weeks for his neck pain as well. I did offer a RX for a muscle relaxant, however patient would like to try only Mobic for now. Can also consider alternating heat and ice for neck pain as well. Will also place PT referral for neck pain and patient will plan to follow up with Dr. Childress if symptoms are not improving and/or are worsening.   - XR Cervical Spine 2/3 Views  - Physical Therapy Referral    2. Migraine without status migrainosus, not intractable, unspecified migraine type  - Suspect neck pain is contributing to migraines. Plan for patient to try the above management options for his neck pain to hopefully improve his migraines. Return to clinic and ER precautions discussed.     Follow up as needed. Reasons to follow-up sooner or seek emergent care reviewed.     Jewell Castelan PA-C  [facility]       Subjective     Aren QUEZADA Mario Wright is a 23 year old male who presents to clinic today for the following health issues:    HPI   Chief Complaint   Patient presents with     Pain     Pain in his neck for the last 2-3 months, neck feels tense/ tight, headaches occurring daily, when he twists his neck to the right a pain radiates down his shoulder, he has been seeing chiropractor for nerve pain as he recently started working out heavily       Aren presents with concerns of neck pain for the last 2-3 months (since the beginning of December). He notes the pain started at the time he began to take over time from his friend at work. 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 He  denies any injury or trauma to his neck. He notes his pain is right over the cervical vertebrae and he thinks it radiates into his left shoulder which has been bothering him recently as well which he notes he has a suspected left labral tear that he is working up with Dr. Childress. He also notes some tingling over the neck area but no numbness. He notes looking to the right makes his neck pain worse along with rolling his neck up and around. He has been seeing a chiropractor for his pain and has been doing heat packs to try to see if this will help the pain but is not finding benefit. He has been taking Tylenol and Ibuprofen which seem to help with the neck pain. He would like to start the work up process for this neck pain.     Patient also notes he has had migraines since the neck pain started. He notes the migraines are primarily on the front part of his head. They are not daily but when they occur they last for most of the day. He notes his headaches get worse when he is completing tasks but also can bother him when he is resting. He rates his pain as a 6/10 on the pain scale. He notes lights bother him but he is ok with noise. He denies any symptoms of nausea or vomiting. He notes he has been trying to take Tylenol and Ibuprofen but this only seems to help his neck pain and not his migraines.     Past medical history reviewed.       Objective    /72 (BP Location: Right arm, Patient Position: Sitting, Cuff Size: Adult Large)   Pulse 68   Temp 98  F (36.7  C) (Temporal)   Wt 83.5 kg (184 lb)   SpO2 97%   BMI 30.62 kg/m    Body mass index is 30.62 kg/m .    Physical Examination:  GENERAL: healthy, alert and no distress  EYES: Eyes grossly normal to inspection, PERRL and conjunctivae and sclerae normal  HENT: ear canals and TM's normal, nose and mouth without ulcers or lesions  NECK: no adenopathy, no asymmetry, masses, or scars and thyroid normal to palpation  RESP: lungs clear to auscultation - no rales,  rhonchi or wheezes  CV: regular rate and rhythm, normal S1 S2, no S3 or S4, no murmur, click or rub, no peripheral edema   ABDOMEN: soft and non-tender  MS: mild tenderness to palpation noted along cervical paraspinal processes along with neck flexion and extension, sensation intact and strength testing 5/5 of neck, otherwise no gross musculoskeletal defects noted  SKIN: no suspicious lesions or rashes  NEURO: Normal strength and tone, mentation intact and speech normal, CN II-XII grossly intact  PSYCH: mentation appears normal, affect normal/bright

## 2024-03-12 ENCOUNTER — OFFICE VISIT (OUTPATIENT)
Dept: SURGERY | Facility: CLINIC | Age: 24
End: 2024-03-12
Payer: COMMERCIAL

## 2024-03-12 VITALS
HEART RATE: 50 BPM | OXYGEN SATURATION: 98 % | HEIGHT: 66 IN | BODY MASS INDEX: 29.73 KG/M2 | WEIGHT: 185 LBS | SYSTOLIC BLOOD PRESSURE: 110 MMHG | DIASTOLIC BLOOD PRESSURE: 70 MMHG | RESPIRATION RATE: 16 BRPM

## 2024-03-12 DIAGNOSIS — K42.9 UMBILICAL HERNIA WITHOUT OBSTRUCTION AND WITHOUT GANGRENE: ICD-10-CM

## 2024-03-12 DIAGNOSIS — R10.31 BILATERAL GROIN PAIN: Primary | ICD-10-CM

## 2024-03-12 DIAGNOSIS — R10.32 BILATERAL GROIN PAIN: Primary | ICD-10-CM

## 2024-03-12 PROCEDURE — 99214 OFFICE O/P EST MOD 30 MIN: CPT | Performed by: SURGERY

## 2024-03-12 NOTE — LETTER
"March 12, 2024          Jan Childress MD      RE:   Aren Emanuel 2000      Dear Colleague,    Thank you for referring your patient, Aren Emanuel, to [facility]. Please see a copy of my visit note below.      Aren Emanuel is a 23 year old male who is seen in consultation at the request of  himself for evaluation of an umbilical hernia.     Aren is well known to me as I repaired bilateral inguinal hernias laparoscopically for him in August of 2020 as well as a primary repair of an incidental umbilical hernia.      He had recurrence of the umbilical hernia noted in December 2022. He had seen my partner Dr. Andrade in 2023 to discuss both the umbilical hernia and concern for small hiatal hernia. A CT completed on 8/22/2023 revealed no significant hiatal hernia but did show mild wall thickenening around the ascending colon to the hepatic flexure. No comment on umbilical hernia but on my review of CT there is a small defect at the umbilicus.      He reports that he continues to have \"gi issues\". He had seen [facility] in the past and had colonoscopy and is taking probiotics and trying to eat healthy.      Last week he was exercising and doing squats and dead lifts and shortly thereafter felt pain in his \"groin\" on both sides of his pubic bone which was worse with pressure on the area and felt more superficial. He was worried about possible hernia recurrence.      REVIEW OF SYSTEMS:  10 point review of systems completed and otherwise negative aside from as listed in HPI.      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)      Anxiety      Attention deficit disorder with hyperactivity(314.01)      Bipolar I disorder, most recent episode (or current) unspecified      Cannabis abuse      Depression      Hernia, abdominal      Prostate infection 10/2019            Past Surgical History         Past Surgical History: "   Procedure Laterality Date    CYSTOSCOPY        HERNIORRHAPHY UMBILICAL N/A 08/26/2020     Procedure: OPEN UMBILICAL HERNIA REPAIR;  Surgeon: Alisha Montero MD;  Location: [facility] OR    LAPAROSCOPIC HERNIORRHAPHY INGUINAL BILATERAL Bilateral 08/26/2020     Procedure: LAPAROSCOPIC BILATERAL INGUINAL HERNIA REPAIR WITH MESH;  Surgeon: Alisha Montero MD;  Location: [facility] OR    TONSILLECTOMY        XR WRIST SURGERY JAMES RIGHT   2017            Family History         Family History   Problem Relation Age of Onset    Depression Mother      Bipolar Disorder Mother      Hyperlipidemia Father      Seizure Disorder Sister      Depression Brother           estranged    Other - See Comments Brother           PTDS    Depression Sister      Anxiety Disorder Sister              Social History            Tobacco Use    Smoking status: Former       Types: Cigarettes       Passive exposure: Past    Smokeless tobacco: Current   Substance Use Topics    Alcohol use: Yes       Comment: occaisional              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  BMI= There is no height or weight on file to calculate BMI.     EXAM:  GENERAL: healthy, alert and no distress   PSYCH: pleasant, normal affect  HEENT: moist mucus membranes, no scleral  icterus  CARDIOVASCULAR:  RRR  RESPIRATORY: non labored breathing  NECK: Neck supple. No adenopathy. Thyroid symmetric, normal size,  GI: soft, nontender, nondistended, small 1cm fascial defect at umbilicus, easily reducible preperitoneal fat, no hepatosplenomegaly, normal bowel sounds  : no hernias palpable in either inguinal canal. Tender with exam.   Extremities: warm and well perfused, no edema  SKIN: No suspicious lesions or rashes    LYMPH: no axillary adenopathy     All labs and imaging personally reviewed and significant for: CT from 2022 reviewed.      ASSESSMENT:  Aren Emanuel is a 23 year old with a PMH s/f prior laparoscopic TEPP bilateral inguinal hernia repair and open primary umbilical hernia repair who presents groin pain and with a small recurrent umbilical hernia. We discussed that on exam I do not feel any evidence of inguinal hernia recurrence. I feel he more likely strained bilateral inguinal ligaments at the insertion on the the pubic bone with significant lifting last week. I would recommend rest/ice/ibuprofen and this should improve. We discussed his umbilical hernia. It is minimally symptomatic. He relates that he took 9 months to recover after his inguinal hernia surgery in 2020 and is not interested in umbilical hernia repair at this time. I think that is just fine and his risk of having issues with this small hernia is low.         PLAN:  Monitor inguinal pain, try ice/motrin/rest  Follow up as needed in the future    Again, thank you for allowing me to participate in the care of your patient.      Sincerely,      Alisha Montero MD

## 2024-03-12 NOTE — PROGRESS NOTES
"[facility] Surgery Clinic Consultation    CHIEF COMPLAINT:  Umbilical hernia    HISTORY OF PRESENT ILLNESS:  Aren Emanuel is a 23 year old male who is seen in consultation at the request of  himself for evaluation of an umbilical hernia.    Aren is well known to me as I repaired bilateral inguinal hernias laparoscopically for him in August of 2020 as well as a primary repair of an incidental umbilical hernia.     He had recurrence of the umbilical hernia noted in December 2022. He had seen my partner Dr. Andrade in 2023 to discuss both the umbilical hernia and concern for small hiatal hernia. A CT completed on 8/22/2023 revealed no significant hiatal hernia but did show mild wall thickenening around the ascending colon to the hepatic flexure. No comment on umbilical hernia but on my review of CT there is a small defect at the umbilicus.     He reports that he continues to have \"gi issues\". He had seen [facility] in the past and had colonoscopy and is taking probiotics and trying to eat healthy.     Last week he was exercising and doing squats and dead lifts and shortly thereafter felt pain in his \"groin\" on both sides of his pubic bone which was worse with pressure on the area and felt more superficial. He was worried about possible hernia recurrence.     REVIEW OF SYSTEMS:  10 point review of systems completed and otherwise negative aside from as listed in HPI.     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)     Anxiety     Attention deficit disorder with hyperactivity(314.01)     Bipolar I disorder, most recent episode (or current) unspecified     Cannabis abuse     Depression     Hernia, abdominal     Prostate infection 10/2019       Past Surgical History:   Procedure Laterality Date    CYSTOSCOPY      HERNIORRHAPHY UMBILICAL N/A 08/26/2020    Procedure: OPEN UMBILICAL HERNIA REPAIR;  Surgeon: Alisha Montero MD;  Location: [facility] OR    LAPAROSCOPIC HERNIORRHAPHY INGUINAL " BILATERAL Bilateral 08/26/2020    Procedure: LAPAROSCOPIC BILATERAL INGUINAL HERNIA REPAIR WITH MESH;  Surgeon: Alisha Montero MD;  Location: [facility] OR    TONSILLECTOMY      XR WRIST SURGERY JAMES RIGHT  2017       Family History   Problem Relation Age of Onset    Depression Mother     Bipolar Disorder Mother     Hyperlipidemia Father     Seizure Disorder Sister     Depression Brother         estranged    Other - See Comments Brother         PTDS    Depression Sister     Anxiety Disorder Sister        Social History     Tobacco Use    Smoking status: Former     Types: Cigarettes     Passive exposure: Past    Smokeless tobacco: Current   Substance Use Topics    Alcohol use: Yes     Comment: occaisional        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  BMI= There is no height or weight on file to calculate BMI.    EXAM:  GENERAL: healthy, alert and no distress   PSYCH: pleasant, normal affect  HEENT: moist mucus membranes, no scleral icterus  CARDIOVASCULAR:  RRR  RESPIRATORY: non labored breathing  NECK: Neck supple. No adenopathy. Thyroid symmetric, normal size,  GI: soft, nontender, nondistended, small 1cm fascial defect at umbilicus, easily reducible preperitoneal fat, no hepatosplenomegaly, normal bowel sounds  : no hernias palpable in either inguinal canal. Tender with  exam.   Extremities: warm and well perfused, no edema  SKIN: No suspicious lesions or rashes    LYMPH: no axillary adenopathy    All labs and imaging personally reviewed and significant for: CT from 2022 reviewed.     ASSESSMENT:  Aren Emanuel is a 23 year old with a PMH s/f prior laparoscopic TEPP bilateral inguinal hernia repair and open primary umbilical hernia repair who presents groin pain and with a small recurrent umbilical hernia. We discussed that on exam I do not feel any evidence of inguinal hernia recurrence. I feel he more likely strained bilateral inguinal ligaments at the insertion on the the pubic bone with significant lifting last week. I would recommend rest/ice/ibuprofen and this should improve. We discussed his umbilical hernia. It is minimally symptomatic. He relates that he took 9 months to recover after his inguinal hernia surgery in 2020 and is not interested in umbilical hernia repair at this time. I think that is just fine and his risk of having issues with this small hernia is low.       PLAN:  Monitor inguinal pain, try ice/motrin/rest  Follow up as needed in the future    30 minutes total time spent on the date of this encounter doing: chart review, review of test results, patient visit, physical exam, education, counseling, developing plan of care, and documenting.    It was my pleasure to participate in the care of Aren Emanuel in clinic today. Thank you for this consultation.         Alisha Montero MD    Please route or send letter to:  Primary Care Provider (PCP) and Referring Provider

## 2024-03-19 ENCOUNTER — OFFICE VISIT (OUTPATIENT)
Dept: FAMILY MEDICINE | Facility: CLINIC | Age: 24
End: 2024-03-19

## 2024-03-19 VITALS
BODY MASS INDEX: 30.02 KG/M2 | WEIGHT: 186 LBS | SYSTOLIC BLOOD PRESSURE: 102 MMHG | TEMPERATURE: 97.6 F | DIASTOLIC BLOOD PRESSURE: 62 MMHG | HEART RATE: 73 BPM | RESPIRATION RATE: 20 BRPM | OXYGEN SATURATION: 98 %

## 2024-03-19 DIAGNOSIS — G89.29 CHRONIC BILATERAL LOW BACK PAIN WITH BILATERAL SCIATICA: Primary | ICD-10-CM

## 2024-03-19 DIAGNOSIS — M54.41 CHRONIC BILATERAL LOW BACK PAIN WITH BILATERAL SCIATICA: Primary | ICD-10-CM

## 2024-03-19 DIAGNOSIS — M54.42 CHRONIC BILATERAL LOW BACK PAIN WITH BILATERAL SCIATICA: Primary | ICD-10-CM

## 2024-03-19 PROCEDURE — 99213 OFFICE O/P EST LOW 20 MIN: CPT | Performed by: STUDENT IN AN ORGANIZED HEALTH CARE EDUCATION/TRAINING PROGRAM

## 2024-03-19 PROCEDURE — G2211 COMPLEX E/M VISIT ADD ON: HCPCS | Performed by: STUDENT IN AN ORGANIZED HEALTH CARE EDUCATION/TRAINING PROGRAM

## 2024-03-19 NOTE — PROGRESS NOTES
Assessment & Plan       ICD-10-CM    1. Chronic bilateral low back pain with bilateral sciatica  M54.42 Orthopedic  Referral - To a [facility] Location (Use POS/Location)    M54.41     G89.29          Reviewed MRI results, not clear why patient is having L>R sx but given chronicity and lack of improvement thus far will follow-up with [facility] spine provider. Pt also states he has missed work and will need paperwork completed, will complete this with him when he follows up for his knee and other MSK concerns. Also due for CPE, HM reviewed and will follow-up at his convenience.    Patient Instructions   Schedule spine consult at [facility]  [address] [facility] [address]  [facility] [address]  [phone] -- appt line    Follow-up for your knee and employer paperwork     Reasons to follow-up sooner or seek emergent care reviewed.       Jan Childress MD, [facility]      Subjective     Aren Emanuel is a 23 year old male who presents to clinic today for the following health issues:    HPI   Chief Complaint   Patient presents with    Consult For     Follow up on MRI of back, patient has been doing physical therapy but it is not helping, feels that pain is getting worse, wants to see a spine specialist at [facility]       No significant change in sx, continues to have L>R radicular sx.   No new sx. No new injury.   Reports doing PT 2x/wk at [facility]    They have tried activity modification, PT, NSAIDs.       The patient is seen by themselves.     Patient's past medical, surgical, social, and family histories were reviewed today and no changes are noted.        Objective    /62 (BP Location: Right arm, Patient Position: Sitting, Cuff Size: Adult Large)   Pulse 73   Temp 97.6  F (36.4  C) (Temporal)   Resp 20   Wt 84.4 kg (186 lb)   SpO2 98%   BMI 30.02 kg/m    Body mass index is 30.02 kg/m .  Alert, NAD  NC/AT  Sclerae anicteric  Regular  Resp nonlabored  Skin warm  and dry  Speech intact. Normal gait.  Appropriate affect      IMAGING:  EXAM: MR LUMBAR SPINE WITHOUT CONTRAST    CLINICAL INFORMATION: 23-year-old male with low back pain radiating into both legs.    COMPARISON: None.    TECHNICAL INFORMATION: Sagittal T2, sagittal T1, sagittal STIR, coronal T1, axial T2, and axial T1-weighted MR images of the lumbar spine on a 1.5 Kelly magnet.    CONTRAST: None.    SEDATION: None.    INTERPRETATION: Lordotic alignment of 5 nonrib-bearing lumbar vertebral bodies with listing of the lumbar spine axis towards the right. No spondylolysis, vertebral collapse, acute fracture, or destructive osseous lesion. Normal pre and paravertebral soft tissues.    Conus medullaris positioned at L1-2, with normal configuration of the terminal nerve roots.    L5-S1: Moderate disc degeneration, mild dorsal annular bulge/endplate ridging, and superimposed right paracentral annular fissure/2-3 mm AP protrusion causing mild right subarticular stenosis. Normal facets and mild bilateral foraminal stenosis.    L4-5: Mild disc degeneration, mild dorsal to foraminal annular bulge, and right foraminal annular fissure with a 3 mm AP protrusion/osteophyte causing mild to moderate right foraminal stenosis with exiting right L4 root encroachment. Normal facets and patent left foramen.    L3-4 through T12: Normal dorsal disc contours and normal facets.    CONCLUSION: Multilevel lumbar degenerative disease with specifics as follows:    1. Foraminal stenosis which is mild to moderate on the right at L4-5 secondary to disc protrusion and osteophyte encroaching the exiting right L4 root. Mild bilaterally at L5-S1.    2. L5-S1 mild right subarticular stenosis secondary to a disc protrusion.    3. No spondylolysis, vertebral collapse, acute fracture, or destructive osseous lesion.    PDB        Electronically signed on 2/24/2024 3:34:00 PM by Carlos Rawls M.D.

## 2024-03-19 NOTE — PATIENT INSTRUCTIONS
Schedule spine consult at [facility]  [address] [facility] [address]  [facility] [address]  [phone] -- appt line    Follow-up for your knee and employer paperwork

## 2024-03-19 NOTE — NURSING NOTE
Chief Complaint   Patient presents with    Consult For     Follow up on MRI of back, patient has been doing physical therapy but it is not helping, feels that pain is getting worse, wants to see a spine specialist at [facility]      Pre-visit Screening:  Immunizations:  up to date  Colonoscopy:  na  Mammogram: na  Asthma Action Test/Plan:  na  PHQ9:  PHQ2 done today  GAD7:  na  Questioned patient about current smoking habits Pt. quit smoking some time ago.  Ok to leave detailed message on voice mail for today's visit only yes, phone # [phone] (home)

## 2024-03-29 ENCOUNTER — OFFICE VISIT (OUTPATIENT)
Dept: FAMILY MEDICINE | Facility: CLINIC | Age: 24
End: 2024-03-29

## 2024-03-29 VITALS
WEIGHT: 186 LBS | TEMPERATURE: 98 F | SYSTOLIC BLOOD PRESSURE: 108 MMHG | BODY MASS INDEX: 30.02 KG/M2 | HEART RATE: 51 BPM | RESPIRATION RATE: 20 BRPM | OXYGEN SATURATION: 98 % | DIASTOLIC BLOOD PRESSURE: 64 MMHG

## 2024-03-29 DIAGNOSIS — M54.42 CHRONIC BILATERAL LOW BACK PAIN WITH BILATERAL SCIATICA: ICD-10-CM

## 2024-03-29 DIAGNOSIS — M25.561 CHRONIC PAIN OF RIGHT KNEE: ICD-10-CM

## 2024-03-29 DIAGNOSIS — G89.29 CHRONIC BILATERAL LOW BACK PAIN WITH BILATERAL SCIATICA: ICD-10-CM

## 2024-03-29 DIAGNOSIS — G89.29 CHRONIC PAIN OF RIGHT KNEE: ICD-10-CM

## 2024-03-29 DIAGNOSIS — Z02.89 ENCOUNTER FOR COMPLETION OF FORM WITH PATIENT: Primary | ICD-10-CM

## 2024-03-29 DIAGNOSIS — M54.41 CHRONIC BILATERAL LOW BACK PAIN WITH BILATERAL SCIATICA: ICD-10-CM

## 2024-03-29 PROCEDURE — 99214 OFFICE O/P EST MOD 30 MIN: CPT | Performed by: STUDENT IN AN ORGANIZED HEALTH CARE EDUCATION/TRAINING PROGRAM

## 2024-03-29 NOTE — PATIENT INSTRUCTIONS
Paperwork faxed     MRI of knee at [facility]   [facility]  [address]  [address]  [facility] [address]  [phone] -  Main Scheduling    Let me know how spine consult goes

## 2024-03-29 NOTE — PROGRESS NOTES
ASSESSMENT & PLAN    1. Encounter for completion of form with patient  2. Chronic bilateral low back pain with bilateral sciatica  Paperwork completed with patient today and faxed at his request. Has spine consult scheduled, will continue PT    3. Chronic pain of right knee  Given lack of improvement with prior tx will get MRI to further evaluate for structural cause of pain. Follow-up after MRI to review and discuss tx plan.   - MR Knee Right w/o Contrast; Future  - Radiology Referral; Future      Patient Instructions   Paperwork faxed     MRI of knee at [facility]   [facility]  [address]  [address]  [facility] [address]  [phone] -  Main Scheduling    Let me know how spine consult goes      Reasons to follow-up sooner or seek emergent care reviewed.     >30 minutes spent on the date of the encounter doing chart review, history and exam, documentation and further activities per the note      Jan Childress MD, [facility]  -----    SUBJECTIVE:  Aren Emanuel is a 23 year old male who is seen in follow-up for   Nursing Notes:   Soumya Johnson CMA  3/29/2024 12:07 PM  Signed  Chief Complaint   Patient presents with    Forms     Has [employer] intermittent absences paperwork that needs to be completed today, has been missing days due to overall body pain but mainly back pain      Pre-visit Screening:  Immunizations:  up to date  Colonoscopy:  na  Mammogram: na  Asthma Action Test/Plan:  na  PHQ9:  na  GAD7:  na  Questioned patient about current smoking habits Pt. quit smoking some time ago.  Ok to leave detailed message on voice mail for today's visit only yes, phone # [phone] (home)          They were last seen 3/19/2024. Needs complete FMLA paperwork for his back, has spine consult at [facility] next week     Follow-up R knee pain, no new injury  Squatting and going upstairs hurt more  Has tried activity modification, PT and NSAIDs    The patient is seen by  themselves.    Patient's past medical, surgical, social, and family histories were reviewed today and no changes are noted.      OBJECTIVE:  /64 (BP Location: Left arm, Patient Position: Sitting, Cuff Size: Adult Large)   Pulse 51   Temp 98  F (36.7  C) (Temporal)   Resp 20   Wt 84.4 kg (186 lb)   SpO2 98%   BMI 30.02 kg/m     Alert, NAD  NC/AT  Sclerae anicteric  Regular  Resp nonlabored  Skin warm and dry  No focal neuro deficits. Speech intact. Normal gait.  Appropriate affect    R knee no effusion, ROM -3 to 135, ext mech intact, ttp med quad tendon and med patellar facet. No defect. Neg lachman, PD, lea/valg. CMS intact distally

## 2024-04-02 ENCOUNTER — TRANSFERRED RECORDS (OUTPATIENT)
Dept: FAMILY MEDICINE | Facility: CLINIC | Age: 24
End: 2024-04-02

## 2024-04-10 ENCOUNTER — TELEPHONE (OUTPATIENT)
Dept: FAMILY MEDICINE | Facility: CLINIC | Age: 24
End: 2024-04-10

## 2024-04-10 DIAGNOSIS — G89.29 CHRONIC PAIN OF RIGHT KNEE: Primary | ICD-10-CM

## 2024-04-10 DIAGNOSIS — M25.561 CHRONIC PAIN OF RIGHT KNEE: Primary | ICD-10-CM

## 2024-04-10 NOTE — TELEPHONE ENCOUNTER
[facility] called stating the MRI of pt's right knee was denied after being reviewed by prior authorization. She stated it was denied stating notes did not specify what is being ruled out.    A peer to peer can be done #[phone] case # [id number]    Evelyn would like a call back # [phone]    I spoke with Dr. Childress he said there is nothing more he can do patient will need to see [facility].    Thanks, Yadira MERINO

## 2024-04-10 NOTE — TELEPHONE ENCOUNTER
I talked with patient regarding the MRI Right Knee that his insurance has not approved. Went over with patient that Dr. Childress is going to refer him to [facility] for additional care/treatment.     I went over with patient that someone with [facility] will be calling him to schedule his consult.     [facility]  [address] [place] [address] [address] [facility] [address]  [facility] [address]  [phone] -- appt line  [phone] -- fax    Referral sent to [facility]    Patient asked what the cost would be if he did the MRI. I advised patient to call [place].     BRITTANEY

## 2024-04-12 NOTE — TELEPHONE ENCOUNTER
Nadia with [facility] called regarding the MRI that Dr. Childress is ordering that patients insurance denied. I went over with Nadia that I did talk with patient yesterday regarding this. Patient was advised that his insurance denied/ did not approve the MRI. Dr. Childress is now referring him to Orthopedic. Patient said that he was going to call [facility] to ask what the MRI would cost if he choose to pay out of pocket.     Nadia said that she will be calling patient.

## 2024-05-02 ENCOUNTER — TRANSFERRED RECORDS (OUTPATIENT)
Dept: FAMILY MEDICINE | Facility: CLINIC | Age: 24
End: 2024-05-02

## 2024-05-06 ENCOUNTER — TRANSFERRED RECORDS (OUTPATIENT)
Dept: FAMILY MEDICINE | Facility: CLINIC | Age: 24
End: 2024-05-06

## 2024-05-22 ENCOUNTER — TRANSFERRED RECORDS (OUTPATIENT)
Dept: FAMILY MEDICINE | Facility: CLINIC | Age: 24
End: 2024-05-22

## 2024-06-04 ENCOUNTER — OFFICE VISIT (OUTPATIENT)
Dept: FAMILY MEDICINE | Facility: CLINIC | Age: 24
End: 2024-06-04

## 2024-06-04 VITALS
BODY MASS INDEX: 30.05 KG/M2 | RESPIRATION RATE: 20 BRPM | SYSTOLIC BLOOD PRESSURE: 106 MMHG | DIASTOLIC BLOOD PRESSURE: 72 MMHG | HEIGHT: 66 IN | WEIGHT: 187 LBS | TEMPERATURE: 97.5 F | HEART RATE: 56 BPM

## 2024-06-04 DIAGNOSIS — R10.9 FLANK PAIN: Primary | ICD-10-CM

## 2024-06-04 DIAGNOSIS — N20.0 KIDNEY STONE: ICD-10-CM

## 2024-06-04 LAB
APPEARANCE UR: CLEAR
BACTERIA, UR: ABNORMAL
BILIRUB UR QL: ABNORMAL
CASTS/LPF: ABNORMAL
COLOR UR: YELLOW
EP/HPF: ABNORMAL
GLUCOSE URINE: ABNORMAL MG/DL
HGB UR QL: ABNORMAL
KETONES UR QL: ABNORMAL MG/DL
MISC.: ABNORMAL
NITRITE UR QL STRIP: ABNORMAL
PH UR STRIP: 6.5 PH (ref 5–7)
PROT UR QL: ABNORMAL MG/DL
RBC, UR MICRO: ABNORMAL (ref ?–2)
SP GR UR STRIP: 1.02
UROBILINOGEN UR QL STRIP: 0.2 EU/DL (ref 0.2–1)
WBC #/AREA URNS HPF: ABNORMAL /[HPF]
WBC, UR MICRO: ABNORMAL (ref ?–2)

## 2024-06-04 PROCEDURE — 99213 OFFICE O/P EST LOW 20 MIN: CPT | Performed by: FAMILY MEDICINE

## 2024-06-04 PROCEDURE — 81001 URINALYSIS AUTO W/SCOPE: CPT | Performed by: FAMILY MEDICINE

## 2024-06-04 NOTE — PROGRESS NOTES
SUBJECTIVE:  Aren Emanuel, a 23 year old male scheduled an appointment to discuss the following issues:  Flank pain  Pt noted right flank pain yesterday at work, became more severe and noticed a stone in his urine after a very painful urination.  had some urinary hesitancy and frequency over the weekend    Pt did not see blood in urine at any point    Pain resolved today    No fevers    No nausea or vomiting     Pt has never had a kidney stone in the past.    He has had prostatitis in the past    Pt does have herniated disk by MRI 2/24 so initially thought his pain was related to this- but then he saw stone.    Medical, social, surgical, and family histories reviewed.    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)     Anxiety     Attention deficit disorder with hyperactivity(314.01)     Bipolar I disorder, most recent episode (or current) unspecified     Cannabis abuse     Depression     Hernia, abdominal     Prostate infection 10/2019       Family History   Problem Relation Age of Onset    Depression Mother     Bipolar Disorder Mother     Hyperlipidemia Father     Seizure Disorder Sister     Depression Brother         estranged    Other - See Comments Brother         PTDS    Depression Sister     Anxiety Disorder Sister        Social History     Socioeconomic History    Marital status: Single     Spouse name: Not on file    Number of children: Not on file    Years of education: Not on file    Highest education level: Not on file   Occupational  History    Not on file   Tobacco Use    Smoking status: Former     Types: Cigarettes     Passive exposure: Past    Smokeless tobacco: Current   Substance and Sexual Activity    Alcohol use: Yes     Comment: occaisional     Drug use: Yes     Types: Marijuana, Amphetamines    Sexual activity: Yes     Partners: Female   Other Topics Concern    Parent/sibling w/ CABG, MI or angioplasty before 65F 55M? Not Asked   Social History Narrative    Not on file     Social Determinants of Health     Financial Resource Strain: Not on file   Food Insecurity: Not on file   Transportation Needs: Not on file   Physical Activity: Not on file   Stress: Not on file   Social Connections: Not on file   Interpersonal Safety: Not on file   Housing Stability: Not on file       Past Surgical History:   Procedure Laterality Date    CYSTOSCOPY      HERNIORRHAPHY UMBILICAL N/A 08/26/2020    Procedure: OPEN UMBILICAL HERNIA REPAIR;  Surgeon: Alisha Montero MD;  Location:  OR    LAPAROSCOPIC HERNIORRHAPHY INGUINAL BILATERAL Bilateral 08/26/2020    Procedure: LAPAROSCOPIC BILATERAL INGUINAL HERNIA REPAIR WITH MESH;  Surgeon: Alisha Montero MD;  Location:  OR    TONSILLECTOMY      XR WRIST SURGERY JAMES RIGHT  2017       No current outpatient medications on file.     No current facility-administered medications for this visit.        Allergies: Patient has no known allergies.      Immunization History   Administered Date(s) Administered    COVID-19 Monovalent 18+ (Moderna) 08/25/2021, 01/28/2022    DTAP (<7y) 1999, 1999, 01/21/2000, 10/09/2000, 06/10/2004    HEPATITIS A (PEDS 12M-18Y) 04/09/2007, 06/26/2008    HIB (PRP-T) 1999, 1999, 01/21/2000, 10/09/2000    HPV9 10/20/2016, 12/12/2016, 08/28/2017    Hepatitis B, Peds 1999, 01/21/2000, 04/07/2000    Influenza (IIV3) PF 10/16/2003    Influenza Vaccine >6 months,quad, PF 10/07/2015, 10/20/2016    Influenza, seasonal, injectable, PF 09/01/2011, 09/12/2012    MMR  "07/06/2000, 08/25/2003    Meningococcal ACWY (Menactra ) 06/01/2011, 10/20/2016    Pneumococcal (PCV 7) 10/09/2000, 02/16/2001    Poliovirus, inactivated (IPV) 1999, 1999, 10/09/2000, 06/10/2004    TDAP (Adacel,Boostrix) 10/03/2022    TDAP Vaccine (Boostrix) 06/01/2011    Varicella 07/06/2000, 04/09/2007        ROS:  CONSTITUTIONAL: NEGATIVE for fever, chills  EYES: NEGATIVE for vision changes   RESP: NEGATIVE for significant cough or SOB  CV: NEGATIVE for chest pain, palpitations   GI: NEGATIVE for nausea, abdominal pain, heartburn, or change in bowel habits  MUSCULOSKELETAL: NEGATIVE for significant arthralgias or myalgia  NEURO: NEGATIVE for weakness, dizziness or paresthesias or headache    OBJECTIVE:  /72 (BP Location: Left arm, Patient Position: Chair, Cuff Size: Adult Regular)   Pulse 56   Temp 97.5  F (36.4  C) (Temporal)   Resp 20   Ht 1.676 m (5' 6\")   Wt 84.8 kg (187 lb)   BMI 30.18 kg/m    EXAM:  GENERAL APPEARANCE: healthy, alert and no distress  EYES: EOMI,  PERRL  HENT: ear canals and TM's normal and nose and mouth without ulcers or lesions  RESP: lungs clear to auscultation - no rales, rhonchi or wheezes  CV: regular rates and rhythm, normal S1 S2, no S3 or S4 and no murmur, click or rub -  ABDOMEN:  soft, nontender, no HSM or masses and bowel sounds normal  PSYCH: mentation appears normal and affect normal/bright    UA neg    ASSESSMENT/PLAN:  (R10.9) Flank pain  (primary encounter diagnosis)  Comment: pt likely passed kidney stone, did not save stone but showed picture from toilet that looked appropriate- no current symptoms , had abd CT 8/23 with no stones seen-suspect no significant stone burden, could also be bladders stone-we discussed options to get imaging, see urology, or simply observe- pt feels strongly he would like CT scan as he is very worried about more stones-feel this is reasonable  Plan: URINALYSIS, ROUTINE ([facility])        CT scan   "

## 2024-06-04 NOTE — NURSING NOTE
Aren Emanuel is here for a consult for kidney stones. Passed one yesterday at work. Still having right side pain    Questioned patient about current smoking habits.  Pt. quit smoking some time ago.  PULSE regular  My Chart: active  CLASSIFICATION OF OVERWEIGHT AND OBESITY BY BMI                        Obesity Class           BMI(kg/m2)  Underweight                                    < 18.5  Normal                                         18.5-24.9  Overweight                                     25.0-29.9  OBESITY                     I                  30.0-34.9                             II                 35.0-39.9  EXTREME OBESITY             III                >40                            Patient's  BMI Body mass index is 30.18 kg/m .  http://hin.nhlbi.nih.gov/menuplanner/menu.cgi  Pre-visit planning  Immunizations - up to date  Colonoscopy -   Mammogram -   Asthma -   PHQ9 -    CARLOS-7 -      The patient has verbalized that it is ok to leave a detailed voice message on the patient's cell phone with results/recommendations from this visit.

## 2024-06-06 ENCOUNTER — TRANSFERRED RECORDS (OUTPATIENT)
Dept: FAMILY MEDICINE | Facility: CLINIC | Age: 24
End: 2024-06-06

## 2024-06-12 ENCOUNTER — TELEPHONE (OUTPATIENT)
Dept: FAMILY MEDICINE | Facility: CLINIC | Age: 24
End: 2024-06-12

## 2024-06-12 DIAGNOSIS — R10.9 FLANK PAIN: Primary | ICD-10-CM

## 2024-06-12 NOTE — TELEPHONE ENCOUNTER
Donna ( [phone] )  with [facility] called regarding the CT Abdomen w/o and with Contrast. Donna stated that with the diagnosis code of flank pain they usually do a CT Abdomen Pelvis with Contrast.     Please complete and sign the pending order for the CT Abdomen Pelvis with Contrast. I will fax the order to     [facility]   [facility]  [address]  [address] [place] [address]  [phone] -  Main Scheduling  [phone] -  Fax     Patient is scheduled today, 6.12.2024 at 9:45am with a 9:30am arrival.     I talked with Soumya regarding this. Per Soumya verbal OK was given to Donna to do the CT Abdomen Pelvis with Contrast. Dr. Junior is in the office today, 6.12.2024 at 10:30am. Will fax the updated order after signed to [phone] ATTCHIVO Thompson.     Thank you

## 2024-06-13 ENCOUNTER — OFFICE VISIT (OUTPATIENT)
Dept: FAMILY MEDICINE | Facility: CLINIC | Age: 24
End: 2024-06-13

## 2024-06-13 VITALS
BODY MASS INDEX: 30.02 KG/M2 | WEIGHT: 186 LBS | SYSTOLIC BLOOD PRESSURE: 108 MMHG | OXYGEN SATURATION: 96 % | HEART RATE: 50 BPM | RESPIRATION RATE: 20 BRPM | DIASTOLIC BLOOD PRESSURE: 72 MMHG

## 2024-06-13 DIAGNOSIS — M54.41 CHRONIC BILATERAL LOW BACK PAIN WITH BILATERAL SCIATICA: ICD-10-CM

## 2024-06-13 DIAGNOSIS — M54.42 CHRONIC BILATERAL LOW BACK PAIN WITH BILATERAL SCIATICA: ICD-10-CM

## 2024-06-13 DIAGNOSIS — G89.29 CHRONIC BILATERAL LOW BACK PAIN WITH BILATERAL SCIATICA: ICD-10-CM

## 2024-06-13 DIAGNOSIS — Z02.89 ENCOUNTER FOR COMPLETION OF FORM WITH PATIENT: Primary | ICD-10-CM

## 2024-06-13 PROCEDURE — 99214 OFFICE O/P EST MOD 30 MIN: CPT | Performed by: STUDENT IN AN ORGANIZED HEALTH CARE EDUCATION/TRAINING PROGRAM

## 2024-06-13 NOTE — PATIENT INSTRUCTIONS
We'll fax forms back     Follow-up with Dre at [facility] spine    Knee MRI as planned

## 2024-06-13 NOTE — PROGRESS NOTES
Assessment & Plan       ICD-10-CM    1. Encounter for completion of form with patient  Z02.89       2. Chronic bilateral low back pain with bilateral sciatica  M54.42     M54.41     G89.29          Reviewed [facility] notes and plan of care going forward  FMLA packet obtained from employer after patient called during visit and completed today with patient  Any further FMLA or work restrictions need to come from specialist managing this condition.     Patient Instructions   We'll fax forms back     Follow-up with Dre at [facility]    Knee MRI as planned      Reasons to follow-up sooner or seek emergent care reviewed.     30 minutes spent on the date of the encounter doing chart review, history and exam, documentation and further activities per the note      Jan Childress MD, [facility]      Subjective     Aren Emanuel is a 23 year old male who presents to clinic today for the following health issues:    HPI   Chief Complaint   Patient presents with    Consult For     Wants to review and update FMLA forms today, feels that he needs to be able to take more then one day off when needed due to his back pain         [facility] note 4/1/24 reviewed, EMG recommended.   [facility] Dr. Mcclellan consult for knee pain 5/16/24 reviewed, MRI pending  Patient reports missing more time than expected with back pain, continues home therapy exercises which occasionally aggravate sx.         Objective    /72 (BP Location: Right arm, Patient Position: Sitting, Cuff Size: Adult Large)   Pulse 50   Resp 20   Wt 84.4 kg (186 lb)   SpO2 96%   BMI 30.02 kg/m    Body mass index is 30.02 kg/m .  Alert, NAD  NC/AT  Sclerae anicteric  Regular  Resp nonlabored  Skin warm and dry  Speech intact. Normal gait.  Appropriate affect      Labs reviewed.

## 2024-06-13 NOTE — NURSING NOTE
Chief Complaint   Patient presents with    Consult For     Wants to review and update FMLA forms today, feels that he needs to be able to take more then one day off when needed due to his back pain      Pre-visit Screening:  Immunizations:  up to date  Colonoscopy:  na  Mammogram: na  Asthma Action Test/Plan:  na  PHQ9:  na  GAD7:  na  Questioned patient about current smoking habits Pt. quit smoking some time ago.  Ok to leave detailed message on voice mail for today's visit only yes, phone # [phone] (home)

## 2024-06-17 PROBLEM — Z76.89 HEALTH CARE HOME: Status: RESOLVED | Noted: 2017-08-28 | Resolved: 2024-06-17

## 2024-06-20 ENCOUNTER — TELEPHONE (OUTPATIENT)
Dept: SURGERY | Facility: CLINIC | Age: 24
End: 2024-06-20
Payer: COMMERCIAL

## 2024-06-20 NOTE — TELEPHONE ENCOUNTER
Patient saw Dr. Montero 3/12/24 for Bilateral groin pain. There were no hernias found. Patient is still experiencing persistent pain and discomfort in groin area. Would like recommendations from Dr. Montero as to what he can do or where to go to try to resolve this issue.     Please call at: [phone]  Ok to leave Vm

## 2024-06-21 NOTE — TELEPHONE ENCOUNTER
"Patient called back to discuss. Not interested in Pain clinic or physical medicine and rehab, reports he has already tried physical therapy. Informed him that PM & R is different than PT.      Patient reports, \"I just want the hernias fixed.\"    Patient would like to see Dr. Andrade for second opinion    Scheduled patient 6/27/24 at 11:45. Advised him to check in at 11:30. Informed him that Dr. Andrade will not see him if he is late to the appointment    He verbalizes understanding of this    No further questions at this time    Petra Garrett, RN-BSN    "

## 2024-06-21 NOTE — TELEPHONE ENCOUNTER
Attempted to call patient to discuss Dr. Montero's recommendations. No answer. Left detailed message:    - PM&R or pain clinic  - See Dr. Andrade for another opinion          Encouraged patient to call back to discuss how he wishes to proceed.    Call back number provided    Petra Garrett, RN-BSN

## 2024-06-23 ENCOUNTER — HEALTH MAINTENANCE LETTER (OUTPATIENT)
Age: 24
End: 2024-06-23

## 2024-06-27 ENCOUNTER — OFFICE VISIT (OUTPATIENT)
Dept: FAMILY MEDICINE | Facility: CLINIC | Age: 24
End: 2024-06-27

## 2024-06-27 VITALS
HEART RATE: 70 BPM | WEIGHT: 188 LBS | OXYGEN SATURATION: 98 % | DIASTOLIC BLOOD PRESSURE: 72 MMHG | BODY MASS INDEX: 30.34 KG/M2 | RESPIRATION RATE: 20 BRPM | TEMPERATURE: 98 F | SYSTOLIC BLOOD PRESSURE: 102 MMHG

## 2024-06-27 DIAGNOSIS — R23.8 SCALP IRRITATION: ICD-10-CM

## 2024-06-27 DIAGNOSIS — L60.3 NAIL FRAGILITY: Primary | ICD-10-CM

## 2024-06-27 DIAGNOSIS — G89.29 CHRONIC PAIN OF RIGHT KNEE: ICD-10-CM

## 2024-06-27 DIAGNOSIS — M25.561 CHRONIC PAIN OF RIGHT KNEE: ICD-10-CM

## 2024-06-27 PROCEDURE — G2211 COMPLEX E/M VISIT ADD ON: HCPCS | Performed by: STUDENT IN AN ORGANIZED HEALTH CARE EDUCATION/TRAINING PROGRAM

## 2024-06-27 PROCEDURE — 99213 OFFICE O/P EST LOW 20 MIN: CPT | Performed by: STUDENT IN AN ORGANIZED HEALTH CARE EDUCATION/TRAINING PROGRAM

## 2024-06-27 NOTE — PROGRESS NOTES
Assessment & Plan       ICD-10-CM    1. Nail fragility  L60.3 Adult Dermatology  Referral - To a Non [facility] Location (Use POS/Location)      2. Scalp irritation  R23.8 Adult Dermatology  Referral - To a Non [facility] Location (Use POS/Location)      3. Chronic pain of right knee  M25.561     G89.29          Dermatology referral in.  Recommend adding crosstraining, increased cardio/low impact exercise. Follow-up with [facility] as planned.   Patient Instructions   Follow-up with [facility]    Stay active    Call if need to change Dermatology clinics      Reasons to follow-up sooner or seek emergent care reviewed.     24 minutes spent on the date of the encounter doing chart review, history and exam, documentation and further activities per the note      Jan Childress MD, [facility]      Subjective     Aren Emanuel is a 23 year old male who presents to clinic today for the following health issues:    HPI   Chief Complaint   Patient presents with    Derm Problem     Has had skin issues his whole life, used to see dermatologist when he was younger but has been awhile, was told that he has chronic dermatitis, never has gone away but does have times where he has  no flare ups, wants to get set up with another dermatologist     Nail Problem     Feels he has fungal infection on thumb nail of the left hand, causing pain, almost ripped off the other day, feels brittle       Reviewed multiple MS[facility] concerns. Recent knee MRI unremarkable, has follow-up scheduled.           Objective    /72 (BP Location: Right arm, Patient Position: Sitting, Cuff Size: Adult Large)   Pulse 70   Temp 98  F (36.7  C) (Temporal)   Resp 20   Wt 85.3 kg (188 lb)   SpO2 98%   BMI 30.34 kg/m    Body mass index is 30.34 kg/m .  Alert, NAD  NC/AT  Sclerae anicteric  Regular  Resp nonlabored  Skin warm and dry, L thumbnail with onychomycosis features  No focal neuro deficits. Speech intact.  Normal gait.       Imaging reviewed.

## 2024-06-27 NOTE — NURSING NOTE
Chief Complaint   Patient presents with    Derm Problem     Has had skin issues his whole life, used to see dermatologist when he was younger but has been awhile, was told that he has chronic dermatitis, never has gone away but does have times where he has  no flare ups, wants to get set up with another dermatologist     Nail Problem     Feels he has fungal infection on thumb nail of the left hand, causing pain, almost ripped off the other day, feels brittle      Pre-visit Screening:  Immunizations:  up to date  Colonoscopy:  na  Mammogram: na  Asthma Action Test/Plan:  na  PHQ9:  na  GAD7:  na  Questioned patient about current smoking habits Pt. quit smoking some time ago.  Ok to leave detailed message on voice mail for today's visit only yes, phone # [phone] (home)

## 2024-06-27 NOTE — PROGRESS NOTES
Assessment & Plan     No diagnosis found.     There are no Patient Instructions on file for this visit.     Reasons to follow-up sooner or seek emergent care reviewed.     >*** minutes spent on the date of the encounter doing chart review, history and exam, documentation and further activities per the note      Jan Childress MD, [facility]      Subjective     Aren Emanuel is a 23 year old male who presents to clinic today for the following health issues:    HPI   Chief Complaint   Patient presents with    Derm Problem     Has had skin issues his whole life, used to see dermatologist when he was younger but has been awhile, was told that he has chronic dermatitis, never has gone away but does have times where he has  no flare ups, wants to get set up with another dermatologist     Nail Problem     Feels he has fungal infection on thumb nail of the left hand, causing pain, almost ripped off the other day, feels brittle       Reviewed multiple MSK concerns. Recent knee MRI unremarkable, has follow-up scheduled.         Objective    /72 (BP Location: Right arm, Patient Position: Sitting, Cuff Size: Adult Large)   Pulse 70   Temp 98  F (36.7  C) (Temporal)   Resp 20   Wt 85.3 kg (188 lb)   SpO2 98%   BMI 30.34 kg/m    Body mass index is 30.34 kg/m .  Alert, NAD  NC/AT  Sclerae anicteric  Regular  Resp nonlabored  Skin warm and dry  No focal neuro deficits. Speech intact. Normal gait.  Appropriate affect    ***     Labs reviewed.  {Diagnostic Test Results (Optional):551434}

## 2024-09-09 ENCOUNTER — TRANSFERRED RECORDS (OUTPATIENT)
Dept: FAMILY MEDICINE | Facility: CLINIC | Age: 24
End: 2024-09-09

## 2024-10-01 ENCOUNTER — TRANSFERRED RECORDS (OUTPATIENT)
Dept: FAMILY MEDICINE | Facility: CLINIC | Age: 24
End: 2024-10-01

## 2024-10-14 ENCOUNTER — OFFICE VISIT (OUTPATIENT)
Dept: FAMILY MEDICINE | Facility: CLINIC | Age: 24
End: 2024-10-14

## 2024-10-14 VITALS
DIASTOLIC BLOOD PRESSURE: 76 MMHG | HEART RATE: 85 BPM | RESPIRATION RATE: 18 BRPM | WEIGHT: 193 LBS | BODY MASS INDEX: 31.15 KG/M2 | SYSTOLIC BLOOD PRESSURE: 104 MMHG | OXYGEN SATURATION: 96 %

## 2024-10-14 DIAGNOSIS — G47.00 INSOMNIA, UNSPECIFIED TYPE: Primary | ICD-10-CM

## 2024-10-14 PROCEDURE — 99213 OFFICE O/P EST LOW 20 MIN: CPT

## 2024-10-14 RX ORDER — HYDROXYZINE HYDROCHLORIDE 25 MG/1
25-50 TABLET, FILM COATED ORAL PRN
Qty: 60 TABLET | Refills: 0 | Status: SHIPPED | OUTPATIENT
Start: 2024-10-14

## 2024-10-14 NOTE — PROGRESS NOTES
Assessment & Plan     1. Insomnia, unspecified type  - Discussed with Aren given his long standing history of insomnia/sleeping problems, recommend seeing sleep medicine to discuss having a sleep study completed, referral placed. In meantime will have Aren continue to focus on sleep hygiene and RX Hydroxyzine 25 mg to take 1-2 tablets as needed for sleep but discussed with Aren this is not a daily medication for sleep and possible long term side effects of medication, and to only use this sparingly, he demonstrated understanding.   - Adult Sleep Eval & Management  Referral - To [facility] Location; Future  - hydrOXYzine HCl (ATARAX) 25 MG tablet; Take 1-2 tablets (25-50 mg) by mouth as needed for other (sleep).  Dispense: 60 tablet; Refill: 0    Follow up with sleep medicine. Reasons to follow-up sooner or seek emergent care reviewed.     Jewell Castelan PA-C  [facility]       Subjective     Aren QUEZADA Mario Wright is a 24 year old male who presents to clinic today for the following health issues:    HPI   Chief Complaint   Patient presents with    Insomnia     Has not been sleeping well, has tried OTC medications which did not work for him, fiance takes hydroxyzine that he took to try and see if it worked and he did feel it helped more then anything else, patient has tried Ambien which he did not like the way it made him feel, has tried trazodone which did not help, Seroquel was to strong for him, felt hydroxyzine worked best because he did not wake up feeling groggy       Aimeejozefad presents today to discuss concerns of insomnia. Notes he has struggled with insomnia his whole life, specifically struggles with staying asleep, notes he can fall asleep easily most nights. He states he works second shift for work so his schedule is constantly different each night which means he can't go to bed around the same night. He states on average he gets about 4-6 hours of sleep each night. Notes he  falls asleep and then will wake up 1-2 hours later either to go to the bathroom or due to anxiety. Patient admits he does have both anxiety and depression but does not want to see psychiatry or be on any meds for anxiety and depression as he was on them in his late teens and did not like them, also does not want to see a therapist. He also notes when he saw psychiatry he was tried on multiple sleeping medications; did not like the way Ambien made him feel, Trazodone did not help and made him very groggy, and Seroquel was too strong and made him sleep the whole night and it was hard for him to wake up in the mornings after taking it. He notes he recently started taking his fiance's Hydroxyzine (about 2-3 tablets of 25 mg dose) and this helped him stay asleep and he did not have any side effects. He also notes elva says he doesn't snore, he has never done a sleep study.       Objective    /76 (BP Location: Right arm, Patient Position: Sitting, Cuff Size: Adult Large)   Pulse 85   Resp 18   Wt 87.5 kg (193 lb)   SpO2 96%   BMI 31.15 kg/m    Body mass index is 31.15 kg/m .    Physical Examination:  GENERAL: healthy, alert and no distress  EYES: Eyes grossly normal to inspection  RESP: no audible wheezing  MS: no gross musculoskeletal defects noted, no edema  SKIN: no suspicious lesions or rashes  PSYCH: mentation appears normal, affect normal/bright

## 2024-10-14 NOTE — NURSING NOTE
Chief Complaint   Patient presents with    Insomnia     Has not been sleeping well, has tried OTC medications which did not work for him, fiance takes hydroxyzine that he took to try and see if it worked and he did feel it helped more then anything else, patient has tried Ambien which he did not like the way it made him feel, has tried trazodone which did not help, Seroquel was to strong for him, felt hydroxyzine worked best because he did not wake up feeling groggy      Pre-visit Screening:  Immunizations:  up to date  Colonoscopy:  na  Mammogram: na  Asthma Action Test/Plan:  na  PHQ9:  na  GAD7:  na  Questioned patient about current smoking habits Pt. quit smoking some time ago.  Ok to leave detailed message on voice mail for today's visit only yes, phone # [phone] (home)

## 2025-01-02 ENCOUNTER — MYC REFILL (OUTPATIENT)
Dept: FAMILY MEDICINE | Facility: CLINIC | Age: 25
End: 2025-01-02

## 2025-01-02 DIAGNOSIS — G47.00 INSOMNIA, UNSPECIFIED TYPE: Primary | ICD-10-CM

## 2025-01-02 RX ORDER — HYDROXYZINE HYDROCHLORIDE 25 MG/1
25-50 TABLET, FILM COATED ORAL PRN
Qty: 60 TABLET | Refills: 0 | Status: SHIPPED | OUTPATIENT
Start: 2025-01-02

## 2025-01-02 RX ORDER — HYDROXYZINE HYDROCHLORIDE 25 MG/1
25-50 TABLET, FILM COATED ORAL PRN
Qty: 60 TABLET | Refills: 0 | Status: CANCELLED | OUTPATIENT
Start: 2025-01-02

## 2025-03-19 ENCOUNTER — MYC REFILL (OUTPATIENT)
Dept: FAMILY MEDICINE | Facility: CLINIC | Age: 25
End: 2025-03-19

## 2025-03-19 DIAGNOSIS — G47.00 INSOMNIA, UNSPECIFIED TYPE: Primary | ICD-10-CM

## 2025-03-19 NOTE — TELEPHONE ENCOUNTER
Pending Prescriptions:                       Disp   Refills    hydrOXYzine HCl (ATARAX) 25 MG tablet     60 tab*             Sig: Take 1-2 tablets (25-50 mg) by mouth as needed           for other (sleep).    Denise please review:  My Chart refill request    I do not see a visit from sleep specialist in chart  Please respond to pt if refill is appropriate  Deborah

## 2025-03-20 RX ORDER — HYDROXYZINE HYDROCHLORIDE 25 MG/1
25-50 TABLET, FILM COATED ORAL PRN
Qty: 60 TABLET | Refills: 0 | Status: SHIPPED | OUTPATIENT
Start: 2025-03-20

## 2025-03-20 NOTE — TELEPHONE ENCOUNTER
Prescription sent in.     Jewell Castelan PA-C  [facility]

## 2025-05-15 ENCOUNTER — TRANSFERRED RECORDS (OUTPATIENT)
Dept: FAMILY MEDICINE | Facility: CLINIC | Age: 25
End: 2025-05-15

## 2025-06-26 ENCOUNTER — OFFICE VISIT (OUTPATIENT)
Dept: FAMILY MEDICINE | Facility: CLINIC | Age: 25
End: 2025-06-26

## 2025-06-26 VITALS
SYSTOLIC BLOOD PRESSURE: 104 MMHG | DIASTOLIC BLOOD PRESSURE: 68 MMHG | OXYGEN SATURATION: 95 % | TEMPERATURE: 98 F | WEIGHT: 210.4 LBS | BODY MASS INDEX: 33.96 KG/M2 | HEART RATE: 65 BPM

## 2025-06-26 DIAGNOSIS — J20.8 ACUTE BRONCHITIS DUE TO OTHER SPECIFIED ORGANISMS: Primary | ICD-10-CM

## 2025-06-26 DIAGNOSIS — R05.2 SUBACUTE COUGH: ICD-10-CM

## 2025-06-26 DIAGNOSIS — F12.10 CANNABIS ABUSE, EPISODIC: ICD-10-CM

## 2025-06-26 DIAGNOSIS — R91.8 PULMONARY NODULES: ICD-10-CM

## 2025-06-26 LAB
ERYTHROCYTE [DISTWIDTH] IN BLOOD BY AUTOMATED COUNT: 12.3 %
HCT VFR BLD AUTO: 44.8 % (ref 40–53)
HEMOGLOBIN: 14.5 G/DL (ref 13.3–17.7)
MCH RBC QN AUTO: 29.2 PG (ref 26–33)
MCHC RBC AUTO-ENTMCNC: 32.4 G/DL (ref 31–36)
MCV RBC AUTO: 90.2 FL (ref 78–100)
PLATELET COUNT - QUEST: 222 10^9/L (ref 150–375)
RBC # BLD AUTO: 4.97 10*12/L (ref 4.4–5.9)
WBC # BLD AUTO: 11.2 10*9/L (ref 4–11)

## 2025-06-26 PROCEDURE — G2211 COMPLEX E/M VISIT ADD ON: HCPCS

## 2025-06-26 PROCEDURE — 85027 COMPLETE CBC AUTOMATED: CPT

## 2025-06-26 PROCEDURE — 36415 COLL VENOUS BLD VENIPUNCTURE: CPT

## 2025-06-26 PROCEDURE — 99214 OFFICE O/P EST MOD 30 MIN: CPT

## 2025-06-26 PROCEDURE — 71046 X-RAY EXAM CHEST 2 VIEWS: CPT

## 2025-06-26 RX ORDER — AZITHROMYCIN 250 MG/1
TABLET, FILM COATED ORAL
Qty: 6 TABLET | Refills: 0 | Status: SHIPPED | OUTPATIENT
Start: 2025-06-26 | End: 2025-07-01

## 2025-06-26 RX ORDER — ALBUTEROL SULFATE 90 UG/1
2 INHALANT RESPIRATORY (INHALATION) EVERY 4 HOURS PRN
Qty: 18 G | Refills: 0 | Status: SHIPPED | OUTPATIENT
Start: 2025-06-26

## 2025-06-26 NOTE — NURSING NOTE
Chief Complaint   Patient presents with    Cough     Believes he may have a lung infection - does smoke weed a lot, thinks it may be contributing - body aches, fatigue, muscle aches for 2 months off and on now.      Pre-visit Screening:  Immunizations:  up to date  Colonoscopy:  na  Mammogram: na  Asthma Action Test/Plan:  na  PHQ9:  na  GAD7:  na  Questioned patient about current smoking habits Pt. yes  Ok to leave detailed message on voice mail for today's visit only yes, phone # [phone] (home)

## 2025-06-26 NOTE — PROGRESS NOTES
Assessment & Plan     1. Acute bronchitis due to other specified organisms (Primary)  - Symptoms and exam consistent with bronchitis. Discussed typically bronchitis is due to viral etiology however with discolored sputum and slightly elevated WBC, will treat as bacterial etiology with Azithromycin x 5 days, RX sent, side effects reviewed. Chest x-ray is negative for pneumonia. Will also RX Albuterol inhaler PRN to help with cough and wheezing. He will follow up in one week if symptoms not improving or worsening.   - albuterol (PROAIR HFA/PROVENTIL HFA/VENTOLIN HFA) 108 (90 Base) MCG/ACT inhaler; Inhale 2 puffs into the lungs every 4 hours as needed for shortness of breath, wheezing or cough.  Dispense: 18 g; Refill: 0  - azithromycin (ZITHROMAX) 250 MG tablet; Take 2 tablets (500 mg) by mouth daily for 1 day, THEN 1 tablet (250 mg) daily for 4 days.  Dispense: 6 tablet; Refill: 0    2. Subacute cough  - See above.   - VENOUS COLLECTION  - Hemogram Platelet (BFP)  - XR Chest 2 Views    3. Cannabis abuse, episodic  - Strongly encouraged smoking cessation.   - VENOUS COLLECTION  - XR Chest 2 Views    4. Pulmonary nodules  - A few scattered pulmonary nodules noted on chest x-ray, per chart review from CT chest on 08/22/23 patient was noted to have benign intrapulmonary  lymph nodes with no further follow up needed.     Follow up as needed. Reasons to follow-up sooner or seek emergent care reviewed.     Jewell Castelan PA-C  [facility]       Subjective     Aren Emanuel is a 24 year old male who presents to clinic today for the following health issues:    HPI   Chief Complaint   Patient presents with    Cough     Believes he may have a lung infection - does smoke weed a lot, thinks it may be contributing - body aches, fatigue, muscle aches for 2 months off and on now.       Aren presents with concerns of an illness. States he had a typical cold about 2 months ago (fever, headache runny nose, cough)  and that all of his symptoms resolved other than this cough. Notes cough is productive with white and at times dark yellow/green mucous, also has some SOB, and wheezing. Notes he does smoke marijuana daily and is trying to quit - feels this is damaging his lungs. States it is hard for him to workout because of the coughing. Has never had bronchitis or pneumonia before.     Daily medications reviewed.       Objective    /68 (BP Location: Right arm, Patient Position: Sitting, Cuff Size: Adult Large)   Pulse 65   Temp 98  F (36.7  C) (Temporal)   Wt 95.4 kg (210 lb 6.4 oz)   SpO2 95%   BMI 33.96 kg/m    Body mass index is 33.96 kg/m .    Physical Examination:  GENERAL: healthy, alert and no distress  EYES: Eyes grossly normal to inspection, PERRL and conjunctivae and sclerae normal  HENT: ear canals and TM's normal, nose and mouth without ulcers or lesions  NECK: no adenopathy, no asymmetry, masses, or scars and thyroid normal to palpation  RESP: trace exp wheezing in lower lung lobes bilaterally otherwise clear to auscultation, no crackles   CV: regular rate and rhythm, normal S1 S2, no S3 or S4, no murmur, click or rub, no peripheral edema   ABDOMEN: soft and non-tender  MS: no gross musculoskeletal defects noted, no edema  SKIN: no suspicious lesions or rashes  PSYCH: mentation appears normal, affect normal/bright    Labs reviewed.  Results for orders placed or performed in visit on 06/26/25   XR Chest 2 Views     Status: None    Narrative    Radiologist Consultation/:    Fax:  [phone]  [phone]  _____________________________________________________________________________________________________________________________________________________________________________________________________________________________________________________________________________________________________________________________________________________________________________________________________________________________________________________________________________________________________  PATIENT NAME: AL AMIR, NIZAR A  YOB: 2000 Age: 24 ACCESSION NUMBER: [id number]  SEX: M ORDERING PROVIDER: Jewell Castelan  FACILITY: [facility] PRIMARY PROVIDER:  PATIENT ID: [id number] INTERESTED PARTY:  Page 1 of 1  _________________________________________________________________________________________________________________________________________________________________________________________________________________________________________________________________________________________________________________________________________________________________________________________  EXAM: XRAY CHEST 2 VIEW PA LATERAL  LOCATION: [facility]  DATE: 6/26/2025  INDICATION: Acute cough  COMPARISON: None.  IMPRESSION: Negative chest.  SIGNED BY: Michael Marcano MD 6/27/2025 1:40 AM   Hemogram Platelet (BFP)     Status: Abnormal   Result Value Ref Range    WBC 11.2 (A) 4.0 - 11 10*9/L    RBC Count 4.97 4.4 - 5.9 10*12/L    Hemoglobin 14.5 13.3 - 17.7 g/dL    Hematocrit 44.8 40.0 - 53.0 %    MCV 90.2 78 - 100 fL    MCH 29.2 26 - 33 pg    MCHC 32.4 31 - 36 g/dL    RDW 12.3 %    Platelet Count 222 150 - 375 10^9/L

## 2025-06-27 ENCOUNTER — RESULTS FOLLOW-UP (OUTPATIENT)
Dept: FAMILY MEDICINE | Facility: CLINIC | Age: 25
End: 2025-06-27

## 2025-07-12 ENCOUNTER — HEALTH MAINTENANCE LETTER (OUTPATIENT)
Age: 25
End: 2025-07-12

## (undated) DEVICE — ESU ELEC BLADE 2.75" COATED/INSULATED E1455

## (undated) DEVICE — ENDO TROCAR FIRST ENTRY KII FIOS ADV FIX 05X75MM CFF14

## (undated) DEVICE — ESU GROUND PAD UNIVERSAL W/O CORD

## (undated) DEVICE — ENDO TROCAR BLUNT TIP KII BALLOON 12X100MM C0R47

## (undated) DEVICE — SU VICRYL 0 UR-6 27" J603H

## (undated) DEVICE — DECANTER VIAL 2006S

## (undated) DEVICE — SOL NACL 0.9% IRRIG 1000ML BOTTLE 2F7124

## (undated) DEVICE — DRSG STERI STRIP 1/2X4" R1547

## (undated) DEVICE — NDL 25GA 1.5" 305127

## (undated) DEVICE — SUCTION CANISTER MEDIVAC LINER 3000ML W/LID 65651-530

## (undated) DEVICE — SU VICRYL 3-0 SH 27" J316H

## (undated) DEVICE — GLOVE PROTEXIS MICRO 6.0  2D73PM60

## (undated) DEVICE — SYSTEM CLEARIFY VISUALIZATION 21-345

## (undated) DEVICE — ESU PENCIL W/SMOKE EVAC CVPLP2000

## (undated) DEVICE — SU MONOCRYL 4-0 PS-2 18" UND Y496G

## (undated) DEVICE — GLOVE PROTEXIS BLUE W/NEU-THERA 6.5  2D73EB65

## (undated) DEVICE — LINEN TOWEL PACK X5 5464

## (undated) DEVICE — SOL WATER IRRIG 1000ML BOTTLE 2F7114

## (undated) DEVICE — SYR 10ML LL W/O NDL 302995

## (undated) DEVICE — GOWN IMPERVIOUS BREATHABLE SMART LG 89015

## (undated) DEVICE — ENDO SCOPE WARMER LF TM500

## (undated) DEVICE — DRAPE LAP W/ARMBOARD 29410

## (undated) DEVICE — ENDO KIT DISSECT BALL SYS FIRST ENTRY KII FIOS ADV FIX C0K17

## (undated) DEVICE — ESU PENCIL W/HOLSTER E2350H

## (undated) DEVICE — PACK LAP CHOLE SLC15LCFSD

## (undated) DEVICE — Device

## (undated) DEVICE — NDL 22GA 1.5"

## (undated) DEVICE — PREP CHLORAPREP 26ML TINTED ORANGE  260815

## (undated) DEVICE — NDL 19GA 1.5"

## (undated) RX ORDER — DEXAMETHASONE SODIUM PHOSPHATE 4 MG/ML
INJECTION, SOLUTION INTRA-ARTICULAR; INTRALESIONAL; INTRAMUSCULAR; INTRAVENOUS; SOFT TISSUE
Status: DISPENSED
Start: 2020-08-26

## (undated) RX ORDER — PROPOFOL 10 MG/ML
INJECTION, EMULSION INTRAVENOUS
Status: DISPENSED
Start: 2020-08-26

## (undated) RX ORDER — LIDOCAINE HYDROCHLORIDE 20 MG/ML
INJECTION, SOLUTION EPIDURAL; INFILTRATION; INTRACAUDAL; PERINEURAL
Status: DISPENSED
Start: 2020-08-26

## (undated) RX ORDER — CEFAZOLIN SODIUM 2 G/100ML
INJECTION, SOLUTION INTRAVENOUS
Status: DISPENSED
Start: 2020-08-26

## (undated) RX ORDER — LIDOCAINE HYDROCHLORIDE 20 MG/ML
JELLY TOPICAL
Status: DISPENSED
Start: 2020-04-20

## (undated) RX ORDER — ONDANSETRON 2 MG/ML
INJECTION INTRAMUSCULAR; INTRAVENOUS
Status: DISPENSED
Start: 2020-08-26

## (undated) RX ORDER — FENTANYL CITRATE 0.05 MG/ML
INJECTION, SOLUTION INTRAMUSCULAR; INTRAVENOUS
Status: DISPENSED
Start: 2020-08-26

## (undated) RX ORDER — FENTANYL CITRATE 50 UG/ML
INJECTION, SOLUTION INTRAMUSCULAR; INTRAVENOUS
Status: DISPENSED
Start: 2020-08-26

## (undated) RX ORDER — OXYCODONE AND ACETAMINOPHEN 5; 325 MG/1; MG/1
TABLET ORAL
Status: DISPENSED
Start: 2020-08-26